# Patient Record
Sex: FEMALE | Race: WHITE | Employment: OTHER | URBAN - METROPOLITAN AREA
[De-identification: names, ages, dates, MRNs, and addresses within clinical notes are randomized per-mention and may not be internally consistent; named-entity substitution may affect disease eponyms.]

---

## 2017-01-01 ENCOUNTER — TRANSFERRED RECORDS (OUTPATIENT)
Dept: HEALTH INFORMATION MANAGEMENT | Facility: CLINIC | Age: 77
End: 2017-01-01

## 2017-01-01 ENCOUNTER — ONCOLOGY VISIT (OUTPATIENT)
Dept: RADIATION ONCOLOGY | Facility: CLINIC | Age: 77
End: 2017-01-01

## 2017-01-01 ENCOUNTER — OFFICE VISIT (OUTPATIENT)
Dept: RADIATION ONCOLOGY | Facility: CLINIC | Age: 77
End: 2017-01-01
Attending: RADIOLOGY
Payer: COMMERCIAL

## 2017-01-01 ENCOUNTER — NURSING HOME VISIT (OUTPATIENT)
Dept: GERIATRICS | Facility: CLINIC | Age: 77
End: 2017-01-01
Payer: COMMERCIAL

## 2017-01-01 ENCOUNTER — APPOINTMENT (OUTPATIENT)
Dept: MRI IMAGING | Facility: CLINIC | Age: 77
DRG: 871 | End: 2017-01-01
Attending: INTERNAL MEDICINE
Payer: COMMERCIAL

## 2017-01-01 ENCOUNTER — TELEPHONE (OUTPATIENT)
Dept: GERIATRIC MEDICINE | Facility: CLINIC | Age: 77
End: 2017-01-01

## 2017-01-01 ENCOUNTER — ANESTHESIA (OUTPATIENT)
Dept: SURGERY | Facility: CLINIC | Age: 77
End: 2017-01-01
Payer: COMMERCIAL

## 2017-01-01 ENCOUNTER — APPOINTMENT (OUTPATIENT)
Dept: GENERAL RADIOLOGY | Facility: CLINIC | Age: 77
End: 2017-01-01
Attending: UROLOGY
Payer: COMMERCIAL

## 2017-01-01 ENCOUNTER — HOSPITAL ENCOUNTER (OUTPATIENT)
Dept: CT IMAGING | Facility: CLINIC | Age: 77
Discharge: HOME | End: 2017-03-31
Attending: RADIOLOGY | Admitting: RADIOLOGY
Payer: COMMERCIAL

## 2017-01-01 ENCOUNTER — HOSPITAL ENCOUNTER (OUTPATIENT)
Facility: CLINIC | Age: 77
Discharge: NURSING, REHAB OR LONG-TERM CARE | End: 2017-03-08
Attending: UROLOGY | Admitting: UROLOGY
Payer: COMMERCIAL

## 2017-01-01 ENCOUNTER — ANESTHESIA EVENT (OUTPATIENT)
Dept: SURGERY | Facility: CLINIC | Age: 77
End: 2017-01-01

## 2017-01-01 ENCOUNTER — APPOINTMENT (OUTPATIENT)
Dept: CT IMAGING | Facility: CLINIC | Age: 77
DRG: 871 | End: 2017-01-01
Attending: INTERNAL MEDICINE
Payer: COMMERCIAL

## 2017-01-01 ENCOUNTER — APPOINTMENT (OUTPATIENT)
Dept: SPEECH THERAPY | Facility: CLINIC | Age: 77
DRG: 871 | End: 2017-01-01
Payer: COMMERCIAL

## 2017-01-01 ENCOUNTER — TELEPHONE (OUTPATIENT)
Dept: GERIATRICS | Facility: CLINIC | Age: 77
End: 2017-01-01

## 2017-01-01 ENCOUNTER — APPOINTMENT (OUTPATIENT)
Dept: GENERAL RADIOLOGY | Facility: CLINIC | Age: 77
DRG: 871 | End: 2017-01-01
Attending: EMERGENCY MEDICINE
Payer: COMMERCIAL

## 2017-01-01 ENCOUNTER — ANESTHESIA EVENT (OUTPATIENT)
Dept: SURGERY | Facility: CLINIC | Age: 77
End: 2017-01-01
Payer: COMMERCIAL

## 2017-01-01 ENCOUNTER — SURGERY (OUTPATIENT)
Age: 77
End: 2017-01-01

## 2017-01-01 ENCOUNTER — APPOINTMENT (OUTPATIENT)
Dept: ULTRASOUND IMAGING | Facility: CLINIC | Age: 77
DRG: 871 | End: 2017-01-01
Attending: INTERNAL MEDICINE
Payer: COMMERCIAL

## 2017-01-01 ENCOUNTER — ANESTHESIA (OUTPATIENT)
Dept: SURGERY | Facility: CLINIC | Age: 77
End: 2017-01-01

## 2017-01-01 ENCOUNTER — HOSPITAL ENCOUNTER (INPATIENT)
Facility: CLINIC | Age: 77
LOS: 1 days | DRG: 871 | End: 2017-04-09
Attending: EMERGENCY MEDICINE | Admitting: HOSPITALIST
Payer: COMMERCIAL

## 2017-01-01 ENCOUNTER — HOSPITAL ENCOUNTER (OUTPATIENT)
Facility: CLINIC | Age: 77
Discharge: NURSING, REHAB OR LONG-TERM CARE | End: 2017-02-21
Attending: UROLOGY | Admitting: UROLOGY
Payer: COMMERCIAL

## 2017-01-01 VITALS
OXYGEN SATURATION: 93 % | BODY MASS INDEX: 30.76 KG/M2 | TEMPERATURE: 98.1 F | DIASTOLIC BLOOD PRESSURE: 61 MMHG | WEIGHT: 191.4 LBS | SYSTOLIC BLOOD PRESSURE: 113 MMHG | HEART RATE: 73 BPM | RESPIRATION RATE: 16 BRPM | HEIGHT: 66 IN

## 2017-01-01 VITALS
SYSTOLIC BLOOD PRESSURE: 112 MMHG | WEIGHT: 192.8 LBS | RESPIRATION RATE: 18 BRPM | OXYGEN SATURATION: 97 % | DIASTOLIC BLOOD PRESSURE: 76 MMHG | TEMPERATURE: 97.7 F | HEIGHT: 66 IN | HEART RATE: 100 BPM | BODY MASS INDEX: 30.98 KG/M2

## 2017-01-01 VITALS
HEART RATE: 77 BPM | HEIGHT: 66 IN | BODY MASS INDEX: 31.05 KG/M2 | WEIGHT: 193.2 LBS | SYSTOLIC BLOOD PRESSURE: 132 MMHG | OXYGEN SATURATION: 92 % | RESPIRATION RATE: 18 BRPM | DIASTOLIC BLOOD PRESSURE: 74 MMHG | TEMPERATURE: 99.1 F

## 2017-01-01 VITALS
WEIGHT: 154 LBS | HEART RATE: 83 BPM | BODY MASS INDEX: 24.75 KG/M2 | RESPIRATION RATE: 16 BRPM | SYSTOLIC BLOOD PRESSURE: 116 MMHG | DIASTOLIC BLOOD PRESSURE: 51 MMHG | TEMPERATURE: 97.7 F | OXYGEN SATURATION: 97 % | HEIGHT: 66 IN

## 2017-01-01 VITALS
HEART RATE: 76 BPM | OXYGEN SATURATION: 95 % | TEMPERATURE: 99.1 F | BODY MASS INDEX: 30.41 KG/M2 | WEIGHT: 189.2 LBS | RESPIRATION RATE: 18 BRPM | HEIGHT: 66 IN | DIASTOLIC BLOOD PRESSURE: 72 MMHG | SYSTOLIC BLOOD PRESSURE: 144 MMHG

## 2017-01-01 VITALS
OXYGEN SATURATION: 96 % | SYSTOLIC BLOOD PRESSURE: 97 MMHG | WEIGHT: 162.3 LBS | HEART RATE: 95 BPM | BODY MASS INDEX: 26.08 KG/M2 | TEMPERATURE: 97.7 F | RESPIRATION RATE: 16 BRPM | HEIGHT: 66 IN | DIASTOLIC BLOOD PRESSURE: 46 MMHG

## 2017-01-01 VITALS
DIASTOLIC BLOOD PRESSURE: 55 MMHG | TEMPERATURE: 97 F | HEART RATE: 94 BPM | OXYGEN SATURATION: 100 % | HEIGHT: 66 IN | BODY MASS INDEX: 25.26 KG/M2 | WEIGHT: 157.2 LBS | RESPIRATION RATE: 20 BRPM | SYSTOLIC BLOOD PRESSURE: 95 MMHG

## 2017-01-01 VITALS — OXYGEN SATURATION: 98 % | SYSTOLIC BLOOD PRESSURE: 105 MMHG | DIASTOLIC BLOOD PRESSURE: 65 MMHG | HEART RATE: 95 BPM

## 2017-01-01 VITALS
OXYGEN SATURATION: 96 % | DIASTOLIC BLOOD PRESSURE: 94 MMHG | HEART RATE: 97 BPM | WEIGHT: 192.2 LBS | RESPIRATION RATE: 18 BRPM | HEIGHT: 66 IN | BODY MASS INDEX: 30.89 KG/M2 | SYSTOLIC BLOOD PRESSURE: 138 MMHG | TEMPERATURE: 97.2 F

## 2017-01-01 VITALS
OXYGEN SATURATION: 98 % | DIASTOLIC BLOOD PRESSURE: 67 MMHG | TEMPERATURE: 97 F | WEIGHT: 157.2 LBS | HEIGHT: 66 IN | BODY MASS INDEX: 25.26 KG/M2 | HEART RATE: 70 BPM | RESPIRATION RATE: 18 BRPM | SYSTOLIC BLOOD PRESSURE: 122 MMHG

## 2017-01-01 VITALS
HEIGHT: 66 IN | RESPIRATION RATE: 22 BRPM | TEMPERATURE: 97.2 F | OXYGEN SATURATION: 92 % | DIASTOLIC BLOOD PRESSURE: 81 MMHG | BODY MASS INDEX: 27.92 KG/M2 | WEIGHT: 173.72 LBS | SYSTOLIC BLOOD PRESSURE: 125 MMHG

## 2017-01-01 VITALS
TEMPERATURE: 97.7 F | OXYGEN SATURATION: 96 % | SYSTOLIC BLOOD PRESSURE: 138 MMHG | BODY MASS INDEX: 25.81 KG/M2 | RESPIRATION RATE: 16 BRPM | WEIGHT: 160.6 LBS | HEIGHT: 66 IN | DIASTOLIC BLOOD PRESSURE: 70 MMHG | HEART RATE: 80 BPM

## 2017-01-01 VITALS
DIASTOLIC BLOOD PRESSURE: 51 MMHG | HEIGHT: 66 IN | SYSTOLIC BLOOD PRESSURE: 100 MMHG | WEIGHT: 160 LBS | RESPIRATION RATE: 14 BRPM | TEMPERATURE: 97.5 F | BODY MASS INDEX: 25.71 KG/M2 | OXYGEN SATURATION: 99 %

## 2017-01-01 VITALS
TEMPERATURE: 96.6 F | OXYGEN SATURATION: 94 % | WEIGHT: 207.4 LBS | HEIGHT: 66 IN | HEART RATE: 85 BPM | SYSTOLIC BLOOD PRESSURE: 134 MMHG | BODY MASS INDEX: 33.33 KG/M2 | DIASTOLIC BLOOD PRESSURE: 66 MMHG | RESPIRATION RATE: 16 BRPM

## 2017-01-01 VITALS
BODY MASS INDEX: 31.91 KG/M2 | OXYGEN SATURATION: 6 % | DIASTOLIC BLOOD PRESSURE: 65 MMHG | WEIGHT: 197.6 LBS | RESPIRATION RATE: 19 BRPM | TEMPERATURE: 97.6 F | SYSTOLIC BLOOD PRESSURE: 131 MMHG | HEART RATE: 90 BPM

## 2017-01-01 VITALS
DIASTOLIC BLOOD PRESSURE: 25 MMHG | BODY MASS INDEX: 25.34 KG/M2 | RESPIRATION RATE: 17 BRPM | TEMPERATURE: 100.3 F | WEIGHT: 157 LBS | SYSTOLIC BLOOD PRESSURE: 94 MMHG | OXYGEN SATURATION: 91 %

## 2017-01-01 DIAGNOSIS — G35 MS (MULTIPLE SCLEROSIS) (H): ICD-10-CM

## 2017-01-01 DIAGNOSIS — C34.32 MALIGNANT NEOPLASM OF LOWER LOBE OF LEFT LUNG (H): Primary | ICD-10-CM

## 2017-01-01 DIAGNOSIS — M25.561 CHRONIC PAIN OF BOTH KNEES: ICD-10-CM

## 2017-01-01 DIAGNOSIS — I82.4Y9 DEEP VEIN THROMBOSIS (DVT) OF PROXIMAL LOWER EXTREMITY, UNSPECIFIED CHRONICITY, UNSPECIFIED LATERALITY (H): ICD-10-CM

## 2017-01-01 DIAGNOSIS — R60.9 EDEMA, UNSPECIFIED TYPE: ICD-10-CM

## 2017-01-01 DIAGNOSIS — C34.31 MALIGNANT NEOPLASM OF LOWER LOBE OF RIGHT LUNG (H): ICD-10-CM

## 2017-01-01 DIAGNOSIS — N20.1 URETERAL STONE: ICD-10-CM

## 2017-01-01 DIAGNOSIS — R91.8 LUNG INFILTRATE: Primary | ICD-10-CM

## 2017-01-01 DIAGNOSIS — B96.4 URINARY TRACT INFECTION DUE TO PROTEUS: Primary | ICD-10-CM

## 2017-01-01 DIAGNOSIS — I10 BENIGN ESSENTIAL HYPERTENSION: ICD-10-CM

## 2017-01-01 DIAGNOSIS — Z86.718 PERSONAL HISTORY OF DVT (DEEP VEIN THROMBOSIS): ICD-10-CM

## 2017-01-01 DIAGNOSIS — R30.0 DYSURIA: ICD-10-CM

## 2017-01-01 DIAGNOSIS — I82.442 ACUTE DEEP VEIN THROMBOSIS (DVT) OF TIBIAL VEIN OF LEFT LOWER EXTREMITY (H): ICD-10-CM

## 2017-01-01 DIAGNOSIS — M79.605 CHRONIC PAIN OF BOTH LOWER EXTREMITIES: Primary | ICD-10-CM

## 2017-01-01 DIAGNOSIS — E11.8 TYPE 2 DIABETES MELLITUS WITH COMPLICATION, WITHOUT LONG-TERM CURRENT USE OF INSULIN (H): ICD-10-CM

## 2017-01-01 DIAGNOSIS — R23.8 SKIN BREAKDOWN: ICD-10-CM

## 2017-01-01 DIAGNOSIS — R09.02 HYPOXIA: Primary | ICD-10-CM

## 2017-01-01 DIAGNOSIS — R60.0 BILATERAL LEG EDEMA: ICD-10-CM

## 2017-01-01 DIAGNOSIS — G89.29 OTHER CHRONIC PAIN: ICD-10-CM

## 2017-01-01 DIAGNOSIS — E43 EDEMA DUE TO MALNUTRITION, DUE TO UNSPECIFIED MALNUTRITION TYPE (H): ICD-10-CM

## 2017-01-01 DIAGNOSIS — D64.9 ANEMIA, UNSPECIFIED TYPE: ICD-10-CM

## 2017-01-01 DIAGNOSIS — C34.92 NON-SMALL CELL CANCER OF LEFT LUNG (H): ICD-10-CM

## 2017-01-01 DIAGNOSIS — S70.12XD HEMATOMA OF LEFT THIGH, SUBSEQUENT ENCOUNTER: ICD-10-CM

## 2017-01-01 DIAGNOSIS — A41.9 SEPTIC SHOCK (H): ICD-10-CM

## 2017-01-01 DIAGNOSIS — R65.21 SEPTIC SHOCK (H): ICD-10-CM

## 2017-01-01 DIAGNOSIS — E11.40 TYPE 2 DIABETES MELLITUS WITH DIABETIC NEUROPATHY, WITHOUT LONG-TERM CURRENT USE OF INSULIN (H): ICD-10-CM

## 2017-01-01 DIAGNOSIS — N20.0 KIDNEY STONE: Primary | ICD-10-CM

## 2017-01-01 DIAGNOSIS — M79.604 CHRONIC PAIN OF BOTH LOWER EXTREMITIES: ICD-10-CM

## 2017-01-01 DIAGNOSIS — Z79.01 LONG TERM (CURRENT) USE OF ANTICOAGULANTS: ICD-10-CM

## 2017-01-01 DIAGNOSIS — G89.29 CHRONIC PAIN OF BOTH LOWER EXTREMITIES: ICD-10-CM

## 2017-01-01 DIAGNOSIS — R63.4 WEIGHT LOSS: ICD-10-CM

## 2017-01-01 DIAGNOSIS — G89.29 CHRONIC PAIN OF BOTH KNEES: ICD-10-CM

## 2017-01-01 DIAGNOSIS — Z86.19 H/O SEPSIS: ICD-10-CM

## 2017-01-01 DIAGNOSIS — R53.83 LETHARGY: Primary | ICD-10-CM

## 2017-01-01 DIAGNOSIS — R39.9 URINARY TRACT INFECTION SYMPTOMS: ICD-10-CM

## 2017-01-01 DIAGNOSIS — T14.8XXA DEEP TISSUE INJURY: Primary | ICD-10-CM

## 2017-01-01 DIAGNOSIS — M62.81 GENERALIZED MUSCLE WEAKNESS: ICD-10-CM

## 2017-01-01 DIAGNOSIS — G89.29 CHRONIC PAIN OF BOTH LOWER EXTREMITIES: Primary | ICD-10-CM

## 2017-01-01 DIAGNOSIS — E78.5 HYPERLIPIDEMIA, UNSPECIFIED HYPERLIPIDEMIA TYPE: ICD-10-CM

## 2017-01-01 DIAGNOSIS — J18.9 PNEUMONIA OF BOTH LUNGS DUE TO INFECTIOUS ORGANISM, UNSPECIFIED PART OF LUNG: ICD-10-CM

## 2017-01-01 DIAGNOSIS — L89.95: ICD-10-CM

## 2017-01-01 DIAGNOSIS — F03.90 DEMENTIA WITHOUT BEHAVIORAL DISTURBANCE, UNSPECIFIED DEMENTIA TYPE: ICD-10-CM

## 2017-01-01 DIAGNOSIS — R09.2 RESPIRATORY ARREST (H): ICD-10-CM

## 2017-01-01 DIAGNOSIS — G92.9 TOXIC ENCEPHALOPATHY: ICD-10-CM

## 2017-01-01 DIAGNOSIS — Z51.81 ENCOUNTER FOR THERAPEUTIC DRUG MONITORING: ICD-10-CM

## 2017-01-01 DIAGNOSIS — I82.442 ACUTE DEEP VEIN THROMBOSIS (DVT) OF TIBIAL VEIN OF LEFT LOWER EXTREMITY (H): Primary | ICD-10-CM

## 2017-01-01 DIAGNOSIS — C34.92 NON-SMALL CELL LUNG CANCER, LEFT (H): Primary | ICD-10-CM

## 2017-01-01 DIAGNOSIS — M79.605 CHRONIC PAIN OF BOTH LOWER EXTREMITIES: ICD-10-CM

## 2017-01-01 DIAGNOSIS — G35 MS (MULTIPLE SCLEROSIS) (H): Primary | ICD-10-CM

## 2017-01-01 DIAGNOSIS — N39.0 URINARY TRACT INFECTION DUE TO PROTEUS: Primary | ICD-10-CM

## 2017-01-01 DIAGNOSIS — C34.92 NON-SMALL CELL CANCER OF LEFT LUNG (H): Primary | ICD-10-CM

## 2017-01-01 DIAGNOSIS — T14.8XXA DEEP TISSUE INJURY: ICD-10-CM

## 2017-01-01 DIAGNOSIS — R78.81 BACTEREMIA: ICD-10-CM

## 2017-01-01 DIAGNOSIS — N39.0 URINARY TRACT INFECTION WITHOUT HEMATURIA, SITE UNSPECIFIED: ICD-10-CM

## 2017-01-01 DIAGNOSIS — M79.604 CHRONIC PAIN OF BOTH LOWER EXTREMITIES: Primary | ICD-10-CM

## 2017-01-01 DIAGNOSIS — R39.81 FUNCTIONAL URINARY INCONTINENCE: ICD-10-CM

## 2017-01-01 DIAGNOSIS — G62.9 NEUROPATHY: ICD-10-CM

## 2017-01-01 DIAGNOSIS — I51.81 STRESS-INDUCED CARDIOMYOPATHY: ICD-10-CM

## 2017-01-01 DIAGNOSIS — R63.4 LOSS OF WEIGHT: ICD-10-CM

## 2017-01-01 DIAGNOSIS — G35 MULTIPLE SCLEROSIS (H): ICD-10-CM

## 2017-01-01 DIAGNOSIS — D72.829 LEUKOCYTOSIS, UNSPECIFIED TYPE: Primary | ICD-10-CM

## 2017-01-01 DIAGNOSIS — N39.0 URINARY TRACT INFECTION, SITE UNSPECIFIED: Primary | ICD-10-CM

## 2017-01-01 DIAGNOSIS — F41.8 DEPRESSION WITH ANXIETY: ICD-10-CM

## 2017-01-01 DIAGNOSIS — A41.9 SEPSIS, DUE TO UNSPECIFIED ORGANISM: ICD-10-CM

## 2017-01-01 DIAGNOSIS — M25.562 CHRONIC PAIN OF BOTH KNEES: ICD-10-CM

## 2017-01-01 DIAGNOSIS — G93.40 ENCEPHALOPATHY: ICD-10-CM

## 2017-01-01 DIAGNOSIS — N20.1 CALCULUS OF URETER: ICD-10-CM

## 2017-01-01 LAB
ALBUMIN SERPL-MCNC: 0.9 G/DL (ref 3.4–5)
ALBUMIN UR-MCNC: 30 MG/DL
ALP SERPL-CCNC: 123 U/L (ref 40–150)
ALT SERPL W P-5'-P-CCNC: 21 U/L (ref 0–50)
ANION GAP SERPL CALCULATED.3IONS-SCNC: 10 MMOL/L (ref 0–15)
ANION GAP SERPL CALCULATED.3IONS-SCNC: 11 MMOL/L (ref 0–15)
ANION GAP SERPL CALCULATED.3IONS-SCNC: 11 MMOL/L (ref 0–15)
ANION GAP SERPL CALCULATED.3IONS-SCNC: 13 MMOL/L (ref 3–14)
ANION GAP SERPL CALCULATED.3IONS-SCNC: 8 MMOL/L (ref 0–15)
ANION GAP SERPL CALCULATED.3IONS-SCNC: 8 MMOL/L (ref 0–15)
APPEARANCE UR: ABNORMAL
APTT PPP: 51 SEC (ref 22–37)
AST SERPL W P-5'-P-CCNC: 41 U/L (ref 0–45)
AST SERPL-CCNC: 14 IU/L (ref 12.37–?)
BASE DEFICIT BLDA-SCNC: 5.5 MMOL/L
BASE EXCESS BLDV CALC-SCNC: 0.3 MMOL/L
BASOPHILS # BLD AUTO: 0 10E9/L (ref 0–0.2)
BASOPHILS NFR BLD AUTO: 0 %
BILIRUB SERPL-MCNC: 0.2 MG/DL (ref 0.2–1.3)
BILIRUB UR QL STRIP: NEGATIVE
BUN SERPL-MCNC: 13 MG/DL (ref 7–24)
BUN SERPL-MCNC: 14 MG/DL (ref 7–24)
BUN SERPL-MCNC: 16 MG/DL (ref 7–24)
BUN SERPL-MCNC: 16 MG/DL (ref 7–24)
BUN SERPL-MCNC: 25 MG/DL (ref 7–24)
BUN SERPL-MCNC: 50 MG/DL (ref 7–30)
CALCIUM SERPL-MCNC: 11.8 MG/DL (ref 8.5–10.1)
CALCIUM SERPL-MCNC: 8.2 MG/DL (ref 8.5–10.1)
CALCIUM SERPL-MCNC: 8.5 MG/DL (ref 8.5–10.1)
CALCIUM SERPL-MCNC: 8.7 MG/DL (ref 8.5–10.1)
CALCIUM SERPL-MCNC: 8.9 MG/DL (ref 8.5–10.1)
CALCIUM SERPL-MCNC: 9.2 MG/DL (ref 8.5–10.1)
CHLORIDE SERPL-SCNC: 114 MMOL/L (ref 94–109)
CHLORIDE SERPLBLD-SCNC: 100 MMOL/L (ref 98–112)
CHLORIDE SERPLBLD-SCNC: 102 MMOL/L (ref 98–112)
CHLORIDE SERPLBLD-SCNC: 102 MMOL/L (ref 98–112)
CHLORIDE SERPLBLD-SCNC: 103 MMOL/L (ref 98–112)
CHLORIDE SERPLBLD-SCNC: 106 MMOL/L (ref 96–112)
CO2 SERPL-SCNC: 25 MMOL/L (ref 20–32)
CO2 SERPL-SCNC: 25 MMOL/L (ref 21–32)
CO2 SERPL-SCNC: 26 MMOL/L (ref 21–32)
CO2 SERPL-SCNC: 26 MMOL/L (ref 21–32)
CO2 SERPL-SCNC: 27 MMOL/L (ref 21–32)
CO2 SERPL-SCNC: 31 MMOL/L (ref 21–32)
COLOR UR AUTO: YELLOW
CREAT SERPL-MCNC: 0.48 MG/DL (ref 0.55–1.02)
CREAT SERPL-MCNC: 0.52 MG/DL (ref 0.55–1.02)
CREAT SERPL-MCNC: 0.53 MG/DL (ref 0.55–1.02)
CREAT SERPL-MCNC: 0.66 MG/DL (ref 0.55–1.02)
CREAT SERPL-MCNC: 0.7 MG/DL (ref 0.55–1.02)
CREAT SERPL-MCNC: 0.75 MG/DL (ref 0.55–1.02)
CREAT SERPL-MCNC: 1.41 MG/DL (ref 0.52–1.04)
D DIMER PPP FEU-MCNC: 12.1 UG/ML FEU (ref 0–0.5)
DIFFERENTIAL METHOD BLD: ABNORMAL
DIFFERENTIAL: ABNORMAL
EOSINOPHIL # BLD AUTO: 0.2 10E9/L (ref 0–0.7)
EOSINOPHIL NFR BLD AUTO: 1 %
ERYTHROCYTE [DISTWIDTH] IN BLOOD BY AUTOMATED COUNT: 16.6 % (ref 11.5–14.5)
ERYTHROCYTE [DISTWIDTH] IN BLOOD BY AUTOMATED COUNT: 17.8 % (ref 10–15)
ERYTHROCYTE [DISTWIDTH] IN BLOOD BY AUTOMATED COUNT: 19.3 % (ref 11.5–14.5)
ERYTHROCYTE [DISTWIDTH] IN BLOOD BY AUTOMATED COUNT: 19.7 % (ref 11.5–14.5)
ERYTHROCYTE [DISTWIDTH] IN BLOOD BY AUTOMATED COUNT: 19.7 % (ref 11.5–14.5)
ERYTHROCYTE [DISTWIDTH] IN BLOOD BY AUTOMATED COUNT: 19.8 % (ref 11.5–14.5)
ERYTHROCYTE [DISTWIDTH] IN BLOOD BY AUTOMATED COUNT: 20.2 % (ref 11.5–14.5)
GFR SERPL CREATININE-BSD FRML MDRD: 36 ML/MIN/1.7M2
GFR SERPL CREATININE-BSD FRML MDRD: >60 ML/MIN/1.73M2
GLUCOSE BLDC GLUCOMTR-MCNC: 106 MG/DL (ref 70–99)
GLUCOSE SERPL-MCNC: 121 MG/DL (ref 74–106)
GLUCOSE SERPL-MCNC: 136 MG/DL (ref 74–106)
GLUCOSE SERPL-MCNC: 62 MG/DL (ref 70–99)
GLUCOSE SERPL-MCNC: 84 MG/DL (ref 74–106)
GLUCOSE SERPL-MCNC: 86 MG/DL (ref 74–106)
GLUCOSE SERPL-MCNC: 97 MG/DL (ref 74–106)
GLUCOSE UR STRIP-MCNC: NEGATIVE MG/DL
HBA1C MFR BLD: 5 % (ref 0–5.7)
HCO3 BLD-SCNC: 24 MMOL/L (ref 21–28)
HCO3 BLDV-SCNC: 28 MMOL/L (ref 21–28)
HCT VFR BLD AUTO: 25.7 % (ref 36–48)
HCT VFR BLD AUTO: 30.5 % (ref 36–48)
HCT VFR BLD AUTO: 30.8 % (ref 36–48)
HCT VFR BLD AUTO: 31.4 % (ref 36–48)
HCT VFR BLD AUTO: 32.2 % (ref 36–48)
HCT VFR BLD AUTO: 32.4 % (ref 35–47)
HCT VFR BLD AUTO: 33.6 % (ref 36–48)
HEMOGLOBIN: 10 GM/DL (ref 12–16)
HEMOGLOBIN: 10.3 G/DL (ref 12–16)
HEMOGLOBIN: 8.2 G/DL (ref 12–16)
HEMOGLOBIN: 9.5 G/DL (ref 12–16)
HEMOGLOBIN: 9.6 GM/DL (ref 12–16)
HEMOGLOBIN: 9.6 GM/DL (ref 12–16)
HGB BLD-MCNC: 10 G/DL (ref 11.7–15.7)
HGB UR QL STRIP: ABNORMAL
HYALINE CASTS #/AREA URNS LPF: 18 /LPF (ref 0–2)
INR PPP: 1.04 (ref 0.86–1.14)
INR PPP: 1.2 (ref 1–1.2)
INR PPP: 1.4 (ref 1–1.2)
INR PPP: 2 (ref 1–1.2)
INR PPP: 2 (ref 1–1.5)
INR PPP: 2.2 (ref 1–1.2)
INR PPP: 2.5 (ref 1–1.2)
INR PPP: 6.57 (ref 0.86–1.14)
INTERPRETATION ECG - MUSE: NORMAL
KETONES UR STRIP-MCNC: NEGATIVE MG/DL
LACTATE BLD-SCNC: 10.6 MMOL/L (ref 0.7–2.1)
LEUKOCYTE ESTERASE UR QL STRIP: ABNORMAL
LYMPHOCYTES # BLD AUTO: 0.9 10E9/L (ref 0.8–5.3)
LYMPHOCYTES NFR BLD AUTO: 5 %
MACROCYTES BLD QL SMEAR: PRESENT
MCH RBC QN AUTO: 31 PG (ref 27–33)
MCH RBC QN AUTO: 31.7 PG (ref 26.5–33)
MCH RBC QN AUTO: 32 PG (ref 27–33)
MCH RBC QN AUTO: 32 PG (ref 27–33)
MCHC RBC AUTO-ENTMCNC: 30.9 G/DL (ref 31.5–36.5)
MCHC RBC AUTO-ENTMCNC: 31 G/DL (ref 33–36)
MCHC RBC AUTO-ENTMCNC: 31 G/DL (ref 33–36)
MCHC RBC AUTO-ENTMCNC: 31 GM/DL (ref 33–36)
MCHC RBC AUTO-ENTMCNC: 32 GM/DL (ref 33–36)
MCV RBC AUTO: 100 FL (ref 80–100)
MCV RBC AUTO: 101 FL (ref 80–100)
MCV RBC AUTO: 103 FL (ref 78–100)
MCV RBC AUTO: 103 FL (ref 80–100)
MCV RBC AUTO: 104 FL (ref 80–100)
MCV RBC AUTO: 96 FL (ref 80–100)
MCV RBC AUTO: 99 FL (ref 80–100)
METAMYELOCYTES # BLD: 0.2 10E9/L
METAMYELOCYTES NFR BLD MANUAL: 1 %
MONOCYTES # BLD AUTO: 0.2 10E9/L (ref 0–1.3)
MONOCYTES NFR BLD AUTO: 1 %
MUCOUS THREADS #/AREA URNS LPF: PRESENT /LPF
MYELOCYTES # BLD: 0.5 10E9/L
MYELOCYTES NFR BLD MANUAL: 3 %
NEUTROPHILS # BLD AUTO: 15.3 10E9/L (ref 1.6–8.3)
NEUTROPHILS NFR BLD AUTO: 89 %
NITRATE UR QL: NEGATIVE
O2/TOTAL GAS SETTING VFR VENT: 100 %
O2/TOTAL GAS SETTING VFR VENT: 100 %
OXYHGB MFR BLDV: 54 %
PCO2 BLD: 77 MM HG (ref 35–45)
PCO2 BLDV: 66 MM HG (ref 40–50)
PH BLD: 7.1 PH (ref 7.35–7.45)
PH BLDV: 7.23 PH (ref 7.32–7.43)
PH UR STRIP: 6 PH (ref 5–7)
PLATELET # BLD AUTO: 301 K/UL (ref 150–400)
PLATELET # BLD AUTO: 346 10E9/L (ref 150–450)
PLATELET # BLD AUTO: 354 K/UL (ref 150–400)
PLATELET # BLD AUTO: 358 K/UL (ref 150–400)
PLATELET # BLD AUTO: 372 K/UL (ref 150–400)
PLATELET # BLD AUTO: 387 K/UL (ref 150–400)
PLATELET # BLD AUTO: 388 K/UL (ref 150–400)
PLATELET # BLD EST: ABNORMAL 10*3/UL
PO2 BLD: 40 MM HG (ref 80–105)
PO2 BLDV: 37 MM HG (ref 25–47)
POTASSIUM SERPL-SCNC: 3 MMOL/L (ref 3.4–5.3)
POTASSIUM SERPL-SCNC: 3.6 MMOL/L (ref 3.5–5.1)
POTASSIUM SERPL-SCNC: 3.6 MMOL/L (ref 3.5–5.1)
POTASSIUM SERPL-SCNC: 3.8 MMOL/L (ref 3.5–5.1)
POTASSIUM SERPL-SCNC: 4 MMOL/L (ref 3.5–5.1)
POTASSIUM SERPL-SCNC: 4.1 MMOL/L (ref 3.5–5.1)
POTASSIUM SERPL-SCNC: 4.3 MMOL/L (ref 3.4–5.3)
PROT SERPL-MCNC: 5.6 G/DL (ref 6.8–8.8)
PT BLD: 25.2 SEC (ref 10–13)
PT BLD: 28.9 SEC (ref 10.3–13)
RADIOLOGIST FLAGS: ABNORMAL
RBC # BLD AUTO: 2.68 M/UL (ref 4.2–5.4)
RBC # BLD AUTO: 3.04 M/UL (ref 4.2–5.4)
RBC # BLD AUTO: 3.06 M/UL (ref 4–5.4)
RBC # BLD AUTO: 3.07 M/UL (ref 4.2–5.4)
RBC # BLD AUTO: 3.15 10E12/L (ref 3.8–5.2)
RBC # BLD AUTO: 3.21 M/UL (ref 4.2–6.4)
RBC # BLD AUTO: 3.23 M/UL (ref 4.2–5.4)
RBC #/AREA URNS AUTO: 13 /HPF (ref 0–2)
SODIUM SERPL-SCNC: 137 MMOL/L (ref 136–145)
SODIUM SERPL-SCNC: 138 MMOL/L (ref 136–145)
SODIUM SERPL-SCNC: 138 MMOL/L (ref 136–145)
SODIUM SERPL-SCNC: 141 MMOL/L (ref 136–145)
SODIUM SERPL-SCNC: 142 MMOL/L (ref 136–145)
SODIUM SERPL-SCNC: 152 MMOL/L (ref 133–144)
SP GR UR STRIP: 1.02 (ref 1–1.03)
SQUAMOUS #/AREA URNS AUTO: 3 /HPF (ref 0–1)
URN SPEC COLLECT METH UR: ABNORMAL
UROBILINOGEN UR STRIP-MCNC: NORMAL MG/DL (ref 0–2)
WBC # BLD AUTO: 10.2 K/UL (ref 4.3–10.8)
WBC # BLD AUTO: 11.8 K/UL (ref 4.3–10.8)
WBC # BLD AUTO: 12.1 K/UL (ref 4.3–10.5)
WBC # BLD AUTO: 12.2 K/UL (ref 4.3–10.8)
WBC # BLD AUTO: 13.2 K/UL (ref 4.3–10.8)
WBC # BLD AUTO: 17.2 10E9/L (ref 4–11)
WBC # BLD AUTO: 7.8 K/UL (ref 4.3–10.8)
WBC #/AREA URNS AUTO: >182 /HPF (ref 0–2)
WBC CLUMPS #/AREA URNS HPF: PRESENT /HPF

## 2017-01-01 PROCEDURE — 25800025 ZZH RX 258: Performed by: EMERGENCY MEDICINE

## 2017-01-01 PROCEDURE — 99207 ZZC CDG-CORRECTLY CODED, REVIEWED AND AGREE: CPT | Performed by: NURSE PRACTITIONER

## 2017-01-01 PROCEDURE — 99310 SBSQ NF CARE HIGH MDM 45: CPT | Performed by: NURSE PRACTITIONER

## 2017-01-01 PROCEDURE — 37000009 ZZH ANESTHESIA TECHNICAL FEE, EACH ADDTL 15 MIN: Performed by: UROLOGY

## 2017-01-01 PROCEDURE — 87186 SC STD MICRODIL/AGAR DIL: CPT | Performed by: EMERGENCY MEDICINE

## 2017-01-01 PROCEDURE — 71000013 ZZH RECOVERY PHASE 1 LEVEL 1 EA ADDTL HR: Performed by: UROLOGY

## 2017-01-01 PROCEDURE — 25000128 H RX IP 250 OP 636

## 2017-01-01 PROCEDURE — 99309 SBSQ NF CARE MODERATE MDM 30: CPT | Performed by: INTERNAL MEDICINE

## 2017-01-01 PROCEDURE — 96368 THER/DIAG CONCURRENT INF: CPT

## 2017-01-01 PROCEDURE — 73700 CT LOWER EXTREMITY W/O DYE: CPT | Mod: 50

## 2017-01-01 PROCEDURE — 77373 STRTCTC BDY RAD THER TX DLVR: CPT | Performed by: RADIOLOGY

## 2017-01-01 PROCEDURE — 94640 AIRWAY INHALATION TREATMENT: CPT

## 2017-01-01 PROCEDURE — 96375 TX/PRO/DX INJ NEW DRUG ADDON: CPT

## 2017-01-01 PROCEDURE — 87088 URINE BACTERIA CULTURE: CPT | Performed by: EMERGENCY MEDICINE

## 2017-01-01 PROCEDURE — 40000277 XR SURGERY CARM FLUORO LESS THAN 5 MIN W STILLS

## 2017-01-01 PROCEDURE — 99214 OFFICE O/P EST MOD 30 MIN: CPT | Performed by: RADIOLOGY

## 2017-01-01 PROCEDURE — 85379 FIBRIN DEGRADATION QUANT: CPT | Performed by: EMERGENCY MEDICINE

## 2017-01-01 PROCEDURE — 25000128 H RX IP 250 OP 636: Performed by: EMERGENCY MEDICINE

## 2017-01-01 PROCEDURE — 36000050 ZZH SURGERY LEVEL 2 1ST 30 MIN: Performed by: UROLOGY

## 2017-01-01 PROCEDURE — 25000128 H RX IP 250 OP 636: Performed by: UROLOGY

## 2017-01-01 PROCEDURE — 40000171 ZZH STATISTIC PRE-PROCEDURE ASSESSMENT III: Performed by: UROLOGY

## 2017-01-01 PROCEDURE — 00000146 ZZHCL STATISTIC GLUCOSE BY METER IP

## 2017-01-01 PROCEDURE — 31500 INSERT EMERGENCY AIRWAY: CPT

## 2017-01-01 PROCEDURE — 25800025 ZZH RX 258: Performed by: UROLOGY

## 2017-01-01 PROCEDURE — 96365 THER/PROPH/DIAG IV INF INIT: CPT

## 2017-01-01 PROCEDURE — 5A1935Z RESPIRATORY VENTILATION, LESS THAN 24 CONSECUTIVE HOURS: ICD-10-PCS | Performed by: EMERGENCY MEDICINE

## 2017-01-01 PROCEDURE — 25000125 ZZHC RX 250: Performed by: NURSE ANESTHETIST, CERTIFIED REGISTERED

## 2017-01-01 PROCEDURE — 71000027 ZZH RECOVERY PHASE 2 EACH 15 MINS: Performed by: UROLOGY

## 2017-01-01 PROCEDURE — 99207 ZZC NON-BILLABLE SERV PER CHARTING: CPT | Performed by: HOSPITALIST

## 2017-01-01 PROCEDURE — 36680 INSERT NEEDLE BONE CAVITY: CPT

## 2017-01-01 PROCEDURE — 36556 INSERT NON-TUNNEL CV CATH: CPT

## 2017-01-01 PROCEDURE — 99308 SBSQ NF CARE LOW MDM 20: CPT | Performed by: NURSE PRACTITIONER

## 2017-01-01 PROCEDURE — 27210995 ZZH RX 272: Performed by: UROLOGY

## 2017-01-01 PROCEDURE — 25000566 ZZH SEVOFLURANE, EA 15 MIN: Performed by: UROLOGY

## 2017-01-01 PROCEDURE — 71250 CT THORAX DX C-: CPT

## 2017-01-01 PROCEDURE — 77336 RADIATION PHYSICS CONSULT: CPT | Performed by: RADIOLOGY

## 2017-01-01 PROCEDURE — 85730 THROMBOPLASTIN TIME PARTIAL: CPT | Performed by: EMERGENCY MEDICINE

## 2017-01-01 PROCEDURE — 25000128 H RX IP 250 OP 636: Performed by: NURSE ANESTHETIST, CERTIFIED REGISTERED

## 2017-01-01 PROCEDURE — 81001 URINALYSIS AUTO W/SCOPE: CPT | Performed by: EMERGENCY MEDICINE

## 2017-01-01 PROCEDURE — 92950 HEART/LUNG RESUSCITATION CPR: CPT

## 2017-01-01 PROCEDURE — 36000052 ZZH SURGERY LEVEL 2 EA 15 ADDTL MIN: Performed by: UROLOGY

## 2017-01-01 PROCEDURE — 27210794 ZZH OR GENERAL SUPPLY STERILE: Performed by: UROLOGY

## 2017-01-01 PROCEDURE — 12000000 ZZH R&B MED SURG/OB

## 2017-01-01 PROCEDURE — 73721 MRI JNT OF LWR EXTRE W/O DYE: CPT | Mod: LT

## 2017-01-01 PROCEDURE — 76942 ECHO GUIDE FOR BIOPSY: CPT

## 2017-01-01 PROCEDURE — 25000125 ZZHC RX 250

## 2017-01-01 PROCEDURE — 80053 COMPREHEN METABOLIC PANEL: CPT | Performed by: EMERGENCY MEDICINE

## 2017-01-01 PROCEDURE — 84132 ASSAY OF SERUM POTASSIUM: CPT | Performed by: ANESTHESIOLOGY

## 2017-01-01 PROCEDURE — 82805 BLOOD GASES W/O2 SATURATION: CPT | Performed by: EMERGENCY MEDICINE

## 2017-01-01 PROCEDURE — 36415 COLL VENOUS BLD VENIPUNCTURE: CPT | Performed by: ANESTHESIOLOGY

## 2017-01-01 PROCEDURE — 71000012 ZZH RECOVERY PHASE 1 LEVEL 1 FIRST HR: Performed by: UROLOGY

## 2017-01-01 PROCEDURE — 25800025 ZZH RX 258: Performed by: NURSE ANESTHETIST, CERTIFIED REGISTERED

## 2017-01-01 PROCEDURE — 99306 1ST NF CARE HIGH MDM 50: CPT | Performed by: INTERNAL MEDICINE

## 2017-01-01 PROCEDURE — 40000275 ZZH STATISTIC RCP TIME EA 10 MIN

## 2017-01-01 PROCEDURE — 85610 PROTHROMBIN TIME: CPT | Performed by: EMERGENCY MEDICINE

## 2017-01-01 PROCEDURE — 99207 ZZC CDG-CORRECTLY CODED, REVIEWED AND AGREE: CPT | Performed by: INTERNAL MEDICINE

## 2017-01-01 PROCEDURE — 25000125 ZZHC RX 250: Performed by: EMERGENCY MEDICINE

## 2017-01-01 PROCEDURE — 99285 EMERGENCY DEPT VISIT HI MDM: CPT | Mod: 25

## 2017-01-01 PROCEDURE — 37000008 ZZH ANESTHESIA TECHNICAL FEE, 1ST 30 MIN: Performed by: UROLOGY

## 2017-01-01 PROCEDURE — 25000125 ZZHC RX 250: Performed by: UROLOGY

## 2017-01-01 PROCEDURE — C1769 GUIDE WIRE: HCPCS | Performed by: UROLOGY

## 2017-01-01 PROCEDURE — 40000883 ZZH CANCELLED SURGERY UP TO 61-90 MINS: Performed by: UROLOGY

## 2017-01-01 PROCEDURE — 85025 COMPLETE CBC W/AUTO DIFF WBC: CPT | Performed by: EMERGENCY MEDICINE

## 2017-01-01 PROCEDURE — 3E043XZ INTRODUCTION OF VASOPRESSOR INTO CENTRAL VEIN, PERCUTANEOUS APPROACH: ICD-10-PCS | Performed by: EMERGENCY MEDICINE

## 2017-01-01 PROCEDURE — 87077 CULTURE AEROBIC IDENTIFY: CPT | Performed by: EMERGENCY MEDICINE

## 2017-01-01 PROCEDURE — 83605 ASSAY OF LACTIC ACID: CPT | Performed by: EMERGENCY MEDICINE

## 2017-01-01 PROCEDURE — 99223 1ST HOSP IP/OBS HIGH 75: CPT | Mod: AI | Performed by: HOSPITALIST

## 2017-01-01 PROCEDURE — 40000940 XR CHEST PORT 1 VW

## 2017-01-01 PROCEDURE — 94002 VENT MGMT INPAT INIT DAY: CPT

## 2017-01-01 PROCEDURE — 93971 EXTREMITY STUDY: CPT | Mod: LT

## 2017-01-01 PROCEDURE — 40000256 ZZH STATISTIC CARDIOPULM RESUSCITATION

## 2017-01-01 PROCEDURE — 99309 SBSQ NF CARE MODERATE MDM 30: CPT | Performed by: NURSE PRACTITIONER

## 2017-01-01 PROCEDURE — 82803 BLOOD GASES ANY COMBINATION: CPT | Performed by: EMERGENCY MEDICINE

## 2017-01-01 PROCEDURE — 85610 PROTHROMBIN TIME: CPT | Performed by: ANESTHESIOLOGY

## 2017-01-01 PROCEDURE — C1758 CATHETER, URETERAL: HCPCS | Performed by: UROLOGY

## 2017-01-01 PROCEDURE — 87086 URINE CULTURE/COLONY COUNT: CPT | Performed by: EMERGENCY MEDICINE

## 2017-01-01 PROCEDURE — 87040 BLOOD CULTURE FOR BACTERIA: CPT | Performed by: EMERGENCY MEDICINE

## 2017-01-01 RX ORDER — LORAZEPAM 0.5 MG/1
.5-1 TABLET ORAL
Status: DISCONTINUED | OUTPATIENT
Start: 2017-01-01 | End: 2017-01-01 | Stop reason: HOSPADM

## 2017-01-01 RX ORDER — IPRATROPIUM BROMIDE AND ALBUTEROL SULFATE 2.5; .5 MG/3ML; MG/3ML
3 SOLUTION RESPIRATORY (INHALATION) ONCE
Status: DISCONTINUED | OUTPATIENT
Start: 2017-01-01 | End: 2017-01-01 | Stop reason: HOSPADM

## 2017-01-01 RX ORDER — FENTANYL CITRATE 50 UG/ML
25-50 INJECTION, SOLUTION INTRAMUSCULAR; INTRAVENOUS
Status: DISCONTINUED | OUTPATIENT
Start: 2017-01-01 | End: 2017-01-01 | Stop reason: HOSPADM

## 2017-01-01 RX ORDER — SODIUM CHLORIDE, SODIUM LACTATE, POTASSIUM CHLORIDE, CALCIUM CHLORIDE 600; 310; 30; 20 MG/100ML; MG/100ML; MG/100ML; MG/100ML
INJECTION, SOLUTION INTRAVENOUS CONTINUOUS PRN
Status: DISCONTINUED | OUTPATIENT
Start: 2017-01-01 | End: 2017-01-01

## 2017-01-01 RX ORDER — ONDANSETRON 4 MG/1
4 TABLET, ORALLY DISINTEGRATING ORAL EVERY 6 HOURS PRN
Status: DISCONTINUED | OUTPATIENT
Start: 2017-01-01 | End: 2017-01-01 | Stop reason: HOSPADM

## 2017-01-01 RX ORDER — FENTANYL CITRATE 0.05 MG/ML
25-50 INJECTION, SOLUTION INTRAMUSCULAR; INTRAVENOUS
Status: DISCONTINUED | OUTPATIENT
Start: 2017-01-01 | End: 2017-01-01 | Stop reason: HOSPADM

## 2017-01-01 RX ORDER — BISACODYL 10 MG
10 SUPPOSITORY, RECTAL RECTAL
Status: DISCONTINUED | OUTPATIENT
Start: 2017-04-12 | End: 2017-01-01 | Stop reason: HOSPADM

## 2017-01-01 RX ORDER — CIPROFLOXACIN 500 MG/1
500 TABLET, FILM COATED ORAL 2 TIMES DAILY
Qty: 10 TABLET | Refills: 0 | Status: SHIPPED | OUTPATIENT
Start: 2017-01-01 | End: 2017-01-01

## 2017-01-01 RX ORDER — LORAZEPAM 2 MG/ML
.5-1 INJECTION INTRAMUSCULAR
Status: DISCONTINUED | OUTPATIENT
Start: 2017-01-01 | End: 2017-01-01 | Stop reason: HOSPADM

## 2017-01-01 RX ORDER — IPRATROPIUM BROMIDE AND ALBUTEROL SULFATE 2.5; .5 MG/3ML; MG/3ML
1 SOLUTION RESPIRATORY (INHALATION) 4 TIMES DAILY
COMMUNITY

## 2017-01-01 RX ORDER — MORPHINE SULFATE 4 MG/ML
4 INJECTION, SOLUTION INTRAMUSCULAR; INTRAVENOUS ONCE
Status: COMPLETED | OUTPATIENT
Start: 2017-01-01 | End: 2017-01-01

## 2017-01-01 RX ORDER — SODIUM CHLORIDE, SODIUM LACTATE, POTASSIUM CHLORIDE, CALCIUM CHLORIDE 600; 310; 30; 20 MG/100ML; MG/100ML; MG/100ML; MG/100ML
INJECTION, SOLUTION INTRAVENOUS CONTINUOUS
Status: DISCONTINUED | OUTPATIENT
Start: 2017-01-01 | End: 2017-01-01 | Stop reason: HOSPADM

## 2017-01-01 RX ORDER — MEPERIDINE HYDROCHLORIDE 25 MG/ML
12.5 INJECTION INTRAMUSCULAR; INTRAVENOUS; SUBCUTANEOUS
Status: DISCONTINUED | OUTPATIENT
Start: 2017-01-01 | End: 2017-01-01 | Stop reason: HOSPADM

## 2017-01-01 RX ORDER — MORPHINE SULFATE 4 MG/ML
INJECTION, SOLUTION INTRAMUSCULAR; INTRAVENOUS
Status: COMPLETED
Start: 2017-01-01 | End: 2017-01-01

## 2017-01-01 RX ORDER — LIDOCAINE 40 MG/G
CREAM TOPICAL
Status: DISCONTINUED | OUTPATIENT
Start: 2017-01-01 | End: 2017-01-01 | Stop reason: HOSPADM

## 2017-01-01 RX ORDER — ONDANSETRON 2 MG/ML
4 INJECTION INTRAMUSCULAR; INTRAVENOUS EVERY 6 HOURS PRN
Status: DISCONTINUED | OUTPATIENT
Start: 2017-01-01 | End: 2017-01-01 | Stop reason: HOSPADM

## 2017-01-01 RX ORDER — ATROPINE SULFATE 10 MG/ML
1-2 SOLUTION/ DROPS OPHTHALMIC
Status: DISCONTINUED | OUTPATIENT
Start: 2017-01-01 | End: 2017-01-01 | Stop reason: HOSPADM

## 2017-01-01 RX ORDER — VANCOMYCIN HYDROCHLORIDE 1 G/200ML
1000 INJECTION, SOLUTION INTRAVENOUS ONCE
Status: COMPLETED | OUTPATIENT
Start: 2017-01-01 | End: 2017-01-01

## 2017-01-01 RX ORDER — NALOXONE HYDROCHLORIDE 0.4 MG/ML
.1-.4 INJECTION, SOLUTION INTRAMUSCULAR; INTRAVENOUS; SUBCUTANEOUS
Status: DISCONTINUED | OUTPATIENT
Start: 2017-01-01 | End: 2017-01-01 | Stop reason: HOSPADM

## 2017-01-01 RX ORDER — IPRATROPIUM BROMIDE AND ALBUTEROL SULFATE 2.5; .5 MG/3ML; MG/3ML
SOLUTION RESPIRATORY (INHALATION)
Status: COMPLETED
Start: 2017-01-01 | End: 2017-01-01

## 2017-01-01 RX ORDER — HALOPERIDOL 5 MG/ML
.5-1 INJECTION INTRAMUSCULAR
Status: DISCONTINUED | OUTPATIENT
Start: 2017-01-01 | End: 2017-01-01 | Stop reason: HOSPADM

## 2017-01-01 RX ORDER — OXYCODONE HYDROCHLORIDE 5 MG/1
5 TABLET ORAL EVERY 8 HOURS
Qty: 30 TABLET | Refills: 0 | Status: SHIPPED | OUTPATIENT
Start: 2017-01-01 | End: 2017-01-01

## 2017-01-01 RX ORDER — GENTAMICIN SULFATE 60 MG/50ML
120 INJECTION, SOLUTION INTRAVENOUS ONCE
Status: DISCONTINUED | OUTPATIENT
Start: 2017-01-01 | End: 2017-01-01 | Stop reason: HOSPADM

## 2017-01-01 RX ORDER — PIPERACILLIN SODIUM, TAZOBACTAM SODIUM 4; .5 G/20ML; G/20ML
4.5 INJECTION, POWDER, LYOPHILIZED, FOR SOLUTION INTRAVENOUS ONCE
Status: COMPLETED | OUTPATIENT
Start: 2017-01-01 | End: 2017-01-01

## 2017-01-01 RX ORDER — SODIUM CHLORIDE 9 MG/ML
1000 INJECTION, SOLUTION INTRAVENOUS CONTINUOUS
Status: DISCONTINUED | OUTPATIENT
Start: 2017-01-01 | End: 2017-01-01

## 2017-01-01 RX ORDER — ONDANSETRON 4 MG/1
4 TABLET, ORALLY DISINTEGRATING ORAL EVERY 30 MIN PRN
Status: DISCONTINUED | OUTPATIENT
Start: 2017-01-01 | End: 2017-01-01 | Stop reason: HOSPADM

## 2017-01-01 RX ORDER — MORPHINE SULFATE 10 MG/5ML
5-10 SOLUTION ORAL
Status: DISCONTINUED | OUTPATIENT
Start: 2017-01-01 | End: 2017-01-01 | Stop reason: HOSPADM

## 2017-01-01 RX ORDER — MORPHINE SULFATE 2 MG/ML
4 INJECTION, SOLUTION INTRAMUSCULAR; INTRAVENOUS ONCE
Status: COMPLETED | OUTPATIENT
Start: 2017-01-01 | End: 2017-01-01

## 2017-01-01 RX ORDER — OXYCODONE HYDROCHLORIDE 5 MG/1
5-10 TABLET ORAL
Status: DISCONTINUED | OUTPATIENT
Start: 2017-01-01 | End: 2017-01-01 | Stop reason: HOSPADM

## 2017-01-01 RX ORDER — ONDANSETRON 2 MG/ML
4 INJECTION INTRAMUSCULAR; INTRAVENOUS EVERY 30 MIN PRN
Status: DISCONTINUED | OUTPATIENT
Start: 2017-01-01 | End: 2017-01-01 | Stop reason: HOSPADM

## 2017-01-01 RX ORDER — OXYCODONE HYDROCHLORIDE 5 MG/1
5 TABLET ORAL EVERY 6 HOURS
Qty: 30 TABLET | Refills: 0 | Status: SHIPPED | OUTPATIENT
Start: 2017-01-01

## 2017-01-01 RX ORDER — VANCOMYCIN HYDROCHLORIDE 1 G/200ML
1000 INJECTION, SOLUTION INTRAVENOUS
Status: DISCONTINUED | OUTPATIENT
Start: 2017-01-01 | End: 2017-01-01 | Stop reason: HOSPADM

## 2017-01-01 RX ORDER — ACETAMINOPHEN 325 MG/1
650 TABLET ORAL
Status: DISCONTINUED | OUTPATIENT
Start: 2017-01-01 | End: 2017-01-01 | Stop reason: HOSPADM

## 2017-01-01 RX ORDER — MORPHINE SULFATE 20 MG/ML
5-10 SOLUTION ORAL
Status: DISCONTINUED | OUTPATIENT
Start: 2017-01-01 | End: 2017-01-01 | Stop reason: HOSPADM

## 2017-01-01 RX ORDER — LEVOFLOXACIN 5 MG/ML
500 INJECTION, SOLUTION INTRAVENOUS
Status: COMPLETED | OUTPATIENT
Start: 2017-01-01 | End: 2017-01-01

## 2017-01-01 RX ORDER — ACETAMINOPHEN 650 MG/1
650 SUPPOSITORY RECTAL EVERY 6 HOURS PRN
Status: DISCONTINUED | OUTPATIENT
Start: 2017-01-01 | End: 2017-01-01 | Stop reason: HOSPADM

## 2017-01-01 RX ORDER — ARGININE/GLUTAMINE/CALCIUM BMB 7G-7G-1.5G
1 POWDER IN PACKET (EA) ORAL 2 TIMES DAILY
Qty: 60 PACKET | Refills: 1 | COMMUNITY
Start: 2017-01-01

## 2017-01-01 RX ORDER — MORPHINE SULFATE 2 MG/ML
2-4 INJECTION, SOLUTION INTRAMUSCULAR; INTRAVENOUS
Status: DISCONTINUED | OUTPATIENT
Start: 2017-01-01 | End: 2017-01-01 | Stop reason: HOSPADM

## 2017-01-01 RX ORDER — PHENYLEPHRINE HCL IN 0.9% NACL 1 MG/10 ML
40 SYRINGE (ML) INTRAVENOUS ONCE
Status: COMPLETED | OUTPATIENT
Start: 2017-01-01 | End: 2017-01-01

## 2017-01-01 RX ORDER — LEVOFLOXACIN 5 MG/ML
500 INJECTION, SOLUTION INTRAVENOUS
Status: CANCELLED | OUTPATIENT
Start: 2017-01-01

## 2017-01-01 RX ORDER — FENTANYL CITRATE 50 UG/ML
INJECTION, SOLUTION INTRAMUSCULAR; INTRAVENOUS PRN
Status: DISCONTINUED | OUTPATIENT
Start: 2017-01-01 | End: 2017-01-01

## 2017-01-01 RX ORDER — METHYLPREDNISOLONE SODIUM SUCCINATE 125 MG/2ML
125 INJECTION, POWDER, LYOPHILIZED, FOR SOLUTION INTRAMUSCULAR; INTRAVENOUS ONCE
Status: COMPLETED | OUTPATIENT
Start: 2017-01-01 | End: 2017-01-01

## 2017-01-01 RX ADMIN — WATER 3000 ML: 100 INJECTION, SOLUTION INTRAVENOUS at 13:50

## 2017-01-01 RX ADMIN — NOREPINEPHRINE BITARTRATE 0.03 MCG/KG/MIN: 1 INJECTION INTRAVENOUS at 02:58

## 2017-01-01 RX ADMIN — FENTANYL CITRATE 25 MCG: 50 INJECTION, SOLUTION INTRAMUSCULAR; INTRAVENOUS at 13:43

## 2017-01-01 RX ADMIN — Medication 40 MCG: at 03:28

## 2017-01-01 RX ADMIN — VANCOMYCIN HYDROCHLORIDE 1 G: 1 INJECTION, SOLUTION INTRAVENOUS at 13:50

## 2017-01-01 RX ADMIN — SODIUM CHLORIDE, POTASSIUM CHLORIDE, SODIUM LACTATE AND CALCIUM CHLORIDE: 600; 310; 30; 20 INJECTION, SOLUTION INTRAVENOUS at 02:16

## 2017-01-01 RX ADMIN — IPRATROPIUM BROMIDE AND ALBUTEROL SULFATE 3 ML: .5; 3 SOLUTION RESPIRATORY (INHALATION) at 02:52

## 2017-01-01 RX ADMIN — SODIUM CHLORIDE, POTASSIUM CHLORIDE, SODIUM LACTATE AND CALCIUM CHLORIDE: 600; 310; 30; 20 INJECTION, SOLUTION INTRAVENOUS at 13:31

## 2017-01-01 RX ADMIN — MORPHINE SULFATE 4 MG: 4 INJECTION, SOLUTION INTRAMUSCULAR; INTRAVENOUS at 04:06

## 2017-01-01 RX ADMIN — WATER 1000 ML: 100 IRRIGANT IRRIGATION at 13:50

## 2017-01-01 RX ADMIN — SODIUM CHLORIDE 1000 ML: 9 INJECTION, SOLUTION INTRAVENOUS at 01:50

## 2017-01-01 RX ADMIN — PIPERACILLIN AND TAZOBACTAM 4.5 G: 4; .5 INJECTION, POWDER, FOR SOLUTION INTRAVENOUS at 02:27

## 2017-01-01 RX ADMIN — SODIUM CHLORIDE 500 ML: 9 INJECTION, SOLUTION INTRAVENOUS at 03:21

## 2017-01-01 RX ADMIN — PHENYLEPHRINE HYDROCHLORIDE 200 MCG: 10 INJECTION, SOLUTION INTRAMUSCULAR; INTRAVENOUS; SUBCUTANEOUS at 13:28

## 2017-01-01 RX ADMIN — MORPHINE SULFATE 4 MG: 2 INJECTION, SOLUTION INTRAMUSCULAR; INTRAVENOUS at 04:29

## 2017-01-01 RX ADMIN — SODIUM CHLORIDE, POTASSIUM CHLORIDE, SODIUM LACTATE AND CALCIUM CHLORIDE 2097 ML: 600; 310; 30; 20 INJECTION, SOLUTION INTRAVENOUS at 02:05

## 2017-01-01 RX ADMIN — FENTANYL CITRATE 25 MCG: 50 INJECTION, SOLUTION INTRAMUSCULAR; INTRAVENOUS at 13:38

## 2017-01-01 RX ADMIN — LEVOFLOXACIN 500 MG: 5 INJECTION, SOLUTION INTRAVENOUS at 13:37

## 2017-01-01 RX ADMIN — SODIUM BICARBONATE 50 MEQ: 84 INJECTION, SOLUTION INTRAVENOUS at 02:48

## 2017-01-01 RX ADMIN — METHYLPREDNISOLONE SODIUM SUCCINATE 125 MG: 125 INJECTION, POWDER, FOR SOLUTION INTRAMUSCULAR; INTRAVENOUS at 02:48

## 2017-01-12 NOTE — PROGRESS NOTES
The note is generated in the [facility] radiation oncology record and verify system and it will be transferred to the EMR via interface.

## 2017-01-12 NOTE — PATIENT INSTRUCTIONS
Continuing Management of the Effects of Radiation Treatment    The side effects of radiation therapy should gradually decrease in 2 to 3 weeks after you have finished radiation.  Some effects take longer to resolve.    Skin reactions:  Skin changes (such as redness or irritation) should begin to get better gradually.  Some people will have a permanent change in skin color.  Their skin may be more pink or  tan  than the untreated skin.  The skin may be thinner or more fragile than before treatment.  Continue to use a gentle moisturizing lotion for several months.  You should always protect the skin in the area that was treated by using sunscreen of spf 30 or higher.      Other skin care instructions:    Fatigue caused by radiation therapy will decrease and your energy will improve.    For concerns or questions call Department of Therapeutic Radiology [phone]

## 2017-01-12 NOTE — Clinical Note
1/12/2017       RE: Adwoa Peralta  C/O [facility]  [address]  [facility] [address]     Dear Colleague,    Thank you for referring your patient, Adwoa Peralta, to the RADIATION ONCOLOGY CLINIC. Please see a copy of my visit note below.    The note is generated in the [facility] radiation oncology record and verify system and it will be transferred to the EMR via interface.    Again, thank you for allowing me to participate in the care of your patient.      Sincerely,    Iggy Dubose MD

## 2017-01-12 NOTE — PROCEDURES
[facility]        MR#: [id number]        STEREOTACTIC BODY RADIOTHERAPY TREATMENT (SBRT) NOTE        DATE OF SERVICE:  1/10/2017            Diagnosis: C34.32 Malignant neoplasm of lower lobe, left bronchus or lung.  c T1a  N0  M0  .     Medical Indication for SBRT: The patient has medically inoperable Right Non-small cell  lung   cancer due to poor pulmonary function.     Narrative:  This is a treatment note for 4th fraction of the 5 fraction SBRT for right non-small cell   lung   cancer. There was a break after 3 fractions due to inpatient management of sepsis. She has been   discharged since in stable condition.     The patient was positioned in the custom made immobilization in the [employer] Stereotactic body   frame allowing X, Y, Z coordinates to be verified. The patient had a cone beam CT scan today   prior to treatment to verify the new central axis coordinates.       The new XYZ coordinates has shifted to ?  0.1 cm X(lateral), ?  0.9 cm Y(longitudinal), and ?   0.0 cm Z(vertical).  After the cone beam CT verifications, the CAX set up was on the treatment   machine (True-Beam), the patient had treatment delivered with 10 non-coplanar beams. The   patient had 1000 cGy delivered to the 85% isodose line with heterogeneity corrections with a 6   MV photons.  A second cone beam was done midway to verify the set up.     No complications were encountered.  There were no complaint or skin reactions seen. The   patient tolerated the therapy well and left the department in stable condition and will return for   the 5th fraction of total 5 on January 12, 2017.       MARLENY Dubose M.D.   Department of Therapeutic Radiology

## 2017-01-12 NOTE — MR AVS SNAPSHOT
After Visit Summary   1/12/2017    Adwoa Peralta    MRN: [id number]           Patient Information     Date Of Birth          1940        Visit Information        Provider Department      1/12/2017 2:45 PM Iggy Dubose MD Radiation Oncology Clinic        Today's Diagnoses     Malignant neoplasm of lower lobe of left lung (H)    -  1       Care Instructions    Continuing Management of the Effects of Radiation Treatment    The side effects of radiation therapy should gradually decrease in 2 to 3 weeks after you have finished radiation.  Some effects take longer to resolve.    Skin reactions:  Skin changes (such as redness or irritation) should begin to get better gradually.  Some people will have a permanent change in skin color.  Their skin may be more pink or  tan  than the untreated skin.  The skin may be thinner or more fragile than before treatment.  Continue to use a gentle moisturizing lotion for several months.  You should always protect the skin in the area that was treated by using sunscreen of spf 30 or higher.      Other skin care instructions:    Fatigue caused by radiation therapy will decrease and your energy will improve.    For concerns or questions call Department of Therapeutic Radiology [phone]        Follow-ups after your visit        Follow-up notes from your care team     Return in about 3 months (around 4/12/2017).      Your next 10 appointments already scheduled     Mar 31, 2017 10:40 AM   CT CHEST W/O CONTRAST with UUCT1   [facility], [place], [place] ([facility], [facility])    [address] [place] [id number] [phone]           Please bring any scans or X-rays taken at other hospitals, if similar tests were done. Also bring a list of your medicines, including vitamins, minerals and over-the-counter drugs. It is safest to leave personal items at home.  Be sure to tell your doctor:   If you have any allergies.    If there s any chance you are pregnant.   If you are breastfeeding.   If you have any special needs.  You do not need to do anything special to prepare.  Please wear loose clothing, such as a sweat suit or jogging clothes. Avoid snaps, zippers and other metal. We may ask you to undress and put on a hospital gown.            2017 10:00 AM   Return Visit with Iggy Dubose MD   Radiation Oncology Clinic ([facility])    [facility]  1st Floor  [address] [facility] [address]   [phone]              Future tests that were ordered for you today     Open Future Orders        Priority Expected Expires Ordered    CT Chest w/o contrast Routine  2018            Who to contact     Please call your clinic at [phone] to:    Ask questions about your health    Make or cancel appointments    Discuss your medicines    Learn about your test results    Speak to your doctor   If you have compliments or concerns about an experience at your clinic, or if you wish to file a complaint, please contact [facility] Patient Relations at [phone] or email us at [email or web address]         Additional Information About Your Visit        [employer] Information     [employer] is an electronic gateway that provides easy, online access to your medical records. With Topic, you can request a clinic appointment, read your test results, renew a prescription or communicate with your care team.     To sign up for [employer] visit the website at [email or web address]   You will be asked to enter the access code listed below, as well as some personal information. Please follow the directions to create your username and password.     Your access code is: [email or web address]  Expires: 4/3/2017  8:42 AM     Your access code will  in 90 days. If you need help or a new code, please contact your [facility] or  call [phone] for assistance.        Care EveryWhere ID     This is your Care EveryWhere ID. This could be used by other organizations to access your [facility] medical records  [id number]         Blood Pressure from Last 3 Encounters:   01/05/17 118/52   12/21/16 128/76   12/19/16 128/76    Weight from Last 3 Encounters:   12/27/16 91.037 kg (200 lb 11.2 oz)   12/21/16 84.732 kg (186 lb 12.8 oz)   12/19/16 85.548 kg (188 lb 9.6 oz)               Primary Care Provider    None Specified       No primary provider on file.        Thank you!     Thank you for choosing RADIATION ONCOLOGY CLINIC  for your care. Our goal is always to provide you with excellent care. Hearing back from our patients is one way we can continue to improve our services. Please take a few minutes to complete the written survey that you may receive in the mail after your visit with us. Thank you!             Your Updated Medication List - Protect others around you: Learn how to safely use, store and throw away your medicines at www.disposemymeds.org.          This list is accurate as of: 1/12/17 11:59 PM.  Always use your most recent med list.                   Brand Name Dispense Instructions for use    acetaminophen 325 MG tablet    TYLENOL     Take 1,000 mg by mouth 3 times daily AND DAILY PRN       AMPYRA 10 MG Tb12   Generic drug:  Dalfampridine      Take 10 mg by mouth daily       arginine Pack      Take 1 packet by mouth 2 times daily       Calcium Carb-Cholecalciferol 500-200 MG-UNIT Tabs      Take 1 tablet by mouth 2 times daily       ceFAZolin 1 GM vial    ANCEF    1 each    Inject 2 g into the vein every 8 hours for 16 days CBC with differential, creatinine, SGOT weekly while on this medication to be faxed to Dr. Toure office.       CITALOPRAM HYDROBROMIDE PO      Take 20 mg by mouth daily       enoxaparin 40 MG/0.4ML injection    LOVENOX     Inject 0.4 mLs (40 mg) Subcutaneous every 24 hours       GABAPENTIN PO      Take 600 mg by  mouth 2 times daily       LIPITOR PO      Take 10 mg by mouth At Bedtime       metoprolol 25 MG tablet    LOPRESSOR    60 tablet    Take 1 tablet (25 mg) by mouth 2 times daily       miconazole 2 % powder    MICATIN; MICRO GUARD     Apply topically every hour as needed for other (topical candidiasis)       MULTIVITAL PO      Take 1 tablet by mouth every morning       oxyCODONE 5 MG IR tablet    ROXICODONE     Take 1 tablet (5 mg) by mouth every 12 hours       polyethylene glycol powder    MIRALAX/GLYCOLAX     Take 17 g by mouth 2 times daily       traMADol 50 MG tablet    ULTRAM    28 tablet    Take 1 tablet (50 mg) by mouth 2 times daily

## 2017-01-13 NOTE — PROCEDURES
[facility]        MR#: [id number]        STEREOTACTIC BODY RADIOTHERAPY TREATMENT (SBRT) NOTE        DATE OF SERVICE:  1/12/2017            Diagnosis: C34.32 Malignant neoplasm of lower lobe, left bronchus or lung.  c T1a  N0  M0  .     Medical Indication for SBRT: The patient has medically inoperable Right Non-small cell  lung   cancer due to poor pulmonary function.     Narrative:  This is a treatment note for 5th fraction of the 5 fraction SBRT for right non-small cell   lung   cancer. There was a break after 3 fractions due to inpatient management of sepsis. She has been   discharged since in stable condition.     The patient was positioned in the custom made immobilization in the [employer] Stereotactic body   frame allowing X, Y, Z coordinates to be verified. The patient had a cone beam CT scan today   prior to treatment to verify the new central axis coordinates.       The new XYZ coordinates has shifted to ?  0.7 cm X(lateral), ?  0.2 cm Y(longitudinal), and ?   1.1 cm Z(vertical).  After the cone beam CT verifications, the CAX set up was on the treatment   machine (True-Beam), the patient had treatment delivered with 10 non-coplanar beams. The   patient had 1000 cGy delivered to the 85% isodose line with heterogeneity corrections with a 6   MV photons.  A second cone beam was done midway to verify the set up.     No complications were encountered.  There were no complaint or skin reactions seen. The   patient tolerated the therapy well and left the department in stable condition and will return for a   follow up with a non-contrast CT scan of chest in 3 months.       MARLENY Dubose M.D.   Department of Therapeutic Radiology

## 2017-01-16 NOTE — TELEPHONE ENCOUNTER
Nurse called requesting adjustment in pain medication. She has a history of chronic pain. Pain medication significantly tapered during her hospital stay due to altered mental status. Nurse reports that she is refusing to get out of bed and yelling out in pain. She is currently managed on Oxycodone 5 mg Q 12 hours for pain. Nurse is also requesting an order to discontinue olguin catheter.    Assessment/Plan:  Increase Oxycodone 5 mg po Q 8 hours for pain. Continue to monitor mental status.  D/C Olguin catheter.  Follow up appointment 1/17/17.    Araseli Brito NP

## 2017-01-17 NOTE — PROGRESS NOTES
[facility]  INITIAL VISIT NOTE  January 17, 2017    PRIMARY CARE PROVIDER AND CLINIC:  No primary care provider on file. No primary physician on file.    Chief Complaint   Patient presents with     Hospital F/U       HPI:    Adwoa Peralta is a 76 year old  (1940) female who was seen at [facility] on January 17, 2017 for an initial visit. Medical history is notable for MS, DM II, peripheral neuropathy and a RLL non small cell lung cancer s/p 5 fractions of palliative XRT. She was hospitalized at [facility] from 12/24/16 to 1/5/17 where she presented with hypoxia, AMS, RLL infiltrate and pyuria and was treated for severe sepsis secondary to Proteus UTI. Hospital course was complex. TTE with EF 35-40% thought secondary to stress cardiomyopathy in setting of sepsis and she was started on metoprolol. CT abdomen with ureteral stones and she had a right ureteral stent placed 12/27/16.  Blood cultures grew MSSA and group B strep of unclear etiology. Plan is for total of four weeks of cefazolin per ID. The CT abdomen/pelvis was ordered due to ongoing pain several months after the bilateral femur fractures and received a homogenous mass replacing the left vastus lateralis muscle deep to the IT band. MRI demonstrated a hematoma thought secondary to warfarin. She is now on enoxaparin. ID and orthopedic surgery recommended conservative management. Her narcoctics were significantly tapered down during the hospitalization. She was re-admitted to this facility for medical management and long-term care.     Today, Ms. Peralta is seen in her room. Main issue since return from the hospital has been pain control. Per nursing, mental status has cleared. Pain is mostly with cares. Adwoa says the oxycodone is working, but doesn't last long enough. No chest pain or dyspnea. No abdominal pain.     CODE STATUS:   DNR / DNI    ALLERGIES:     Allergies   Allergen Reactions     Dilaudid  [Hydromorphone] Hives     Per patient      Questran [Cholestyramine] Rash     Itchy rash       PAST MEDICAL HISTORY:   Past Medical History   Diagnosis Date     Neuromuscular disorder (H)      Hypertension      Vitamin D deficiency      Diabetes (H)      new dx 8/2016     Lung nodule      Multiple sclerosis (H)        PAST SURGICAL HISTORY:   Past Surgical History   Procedure Laterality Date     Gyn surgery       Decorticate lung  7/1/2014     Procedure: DECORTICATE LUNG;  Surgeon: Eyal Echols MD;  Location:  OR     Thoracotomy  7/1/2014     Procedure: THORACOTOMY;  Surgeon: Eyal Echols MD;  Location:  OR     Lobectomy lung  7/1/2014     Procedure: LOBECTOMY LUNG;  Surgeon: Eyal Echols MD;  Location:  OR     Cystoscopy, retrogrades, insert stent ureter(s), combined Right 12/27/2016     Procedure: COMBINED CYSTOSCOPY, RETROGRADES, INSERT STENT URETER(S);  Surgeon: Quinn Hawthorne MD;  Location:  OR     Combined cystoscopy, insert stent ureter(s) Right 12/27/2016     Procedure: COMBINED CYSTOSCOPY, INSERT STENT URETER(S);  Surgeon: Quinn Hawthorne MD;  Location:  OR       FAMILY HISTORY:   No family history on file.    SOCIAL HISTORY:   [facility] in [facility]    MEDICATIONS:  Current Outpatient Prescriptions   Medication Sig Dispense Refill     ceFAZolin (ANCEF) 1 GM vial Inject 2 g into the vein every 8 hours for 16 days CBC with differential, creatinine, SGOT weekly while on this medication to be faxed to Dr. Toure office. 1 each      oxyCODONE (ROXICODONE) 5 MG IR tablet Take 1 tablet (5 mg) by mouth every 12 hours (Patient taking differently: Take 5 mg by mouth every 6 hours as needed )  0     enoxaparin (LOVENOX) 40 MG/0.4ML injection Inject 0.4 mLs (40 mg) Subcutaneous every 24 hours       metoprolol (LOPRESSOR) 25 MG tablet Take 1 tablet (25 mg) by mouth 2 times daily 60 tablet      miconazole (MICATIN; MICRO GUARD) 2 % powder Apply topically every hour  "as needed for other (topical candidiasis)       arginine (ARGINAID) PACK Take 1 packet by mouth 2 times daily       GABAPENTIN PO Take 600 mg by mouth 2 times daily       Multiple Vitamins-Minerals (MULTIVITAL PO) Take 1 tablet by mouth every morning       Calcium Carb-Cholecalciferol 500-200 MG-UNIT TABS Take 1 tablet by mouth 2 times daily       Atorvastatin Calcium (LIPITOR PO) Take 10 mg by mouth At Bedtime        acetaminophen (TYLENOL) 325 MG tablet Take 1,000 mg by mouth 3 times daily        polyethylene glycol (MIRALAX/GLYCOLAX) powder Take 17 g by mouth 2 times daily        Dalfampridine (AMPYRA) 10 MG TB12 Take 10 mg by mouth daily        CITALOPRAM HYDROBROMIDE PO Take 20 mg by mouth daily         Post Discharge Medication Reconciliation Status: discharge medications reconciled, continue medications without change except as noted in A/P below.     ROS:  10 point ROS neg other than the symptoms noted above in the HPI.    PHYSICAL EXAM:  /66 mmHg  Pulse 85  Temp(Src) 96.6  F (35.9  C)  Resp 16  Ht 5' 6\" (1.676 m)  Wt 207 lb 6.4 oz (94.076 kg)  BMI 33.49 kg/m2  SpO2 94%  Gen: sitting up in bed, alert, cooperative and in no acute distress  HEENT: normocephalic; oropharynx clear  Card: RRR, S1, S2, no murmurs; PICC with dressing c/d/i in LUE  Resp: lungs clear to auscultation bilaterally, no crackles or wheezes  GI: abdomen soft, not-tender, non-distended  MSK: decreased muscle tone, unna boots in place; RUE edema  Neuro: CX II-XII grossly in tact; ROM in all four extremities grossly in tact  Psych: alert and oriented to self and general situation; normal affect    LABORATORY/IMAGING DATA:  Reviewed as per Epic    ASSESSMENT/PLAN:    Sepsis Secondary to Proteus UTI  MSSA & Grp B Strep Bacteremia  Unclear source for bacteremia. TTE without vegetations. ID consulted during hospitalization.   -- completing course of cefazolin per ID via LUE PICC (last day 1/21/17)  -- labs faxed weekly to ID  -- " follow for clinical signs of infection    Right Ureteral Stones s/p R Ureteral Stent (12/27/16)  Discharged with Lugo in place - this was d/c at facility on 1/16/17.  -- unclear this morning how trial of void has gone  -- completing abx as above  -- needs follow up with Dr. Hawthorne as scheduled on 1/31/17      Acute Toxic/Metabolic Encephalopathy  In setting of sepsis. Had reduction in narcotics during hospitalization. I have only met her once. Still seems a little fuzzy cognitively to me, but nursing feels she is nearing her baseline.   -- treat infection as above  -- supportive cares     Stress Induced Cardiomyopathy (EF 35-40%)  Likely stunned myocardium in setting of sepsis. New on metoprolol and lisinopril (12/30). Discharged on only metoprolol. SBPs 110s-140s, most 120s-130s. Weight stable at 207 lbs.   -- continues on atorvastatin 10 mg qhs, metoprolol 25 mg BID  -- will need repeat TTE w/ cardiology (vs we can order this and manage in house)    Left Thigh Hematoma  In setting of bilateral femur fractures (July 2016). MRI with hematoma along the left vastus lateralis muscleproximally to the level of the greater trochanter thought secondary to warfarin. ID and orthopedic surgery recommended conservative managenment.   -- follow clinically -- will take weeks/months to fully resorb     Acute on Chronic Anemia  Baseline Hgb labile but was 12 last fall. Transfused 2 units PRBCs for violeta Hgb 7.1 and Hgb 8.2 on 1/6/17. Etiology of acute anemia felt multifactorial from thigh hematoma and marrow suppression due to sepsis.   -- follow clinically     Dysphagia  In setting of critical illness, hospitalization and underlying medication conditions.   -- continues on pureed diet with NTL  -- SLP following - advance diet as per them     Chronic Bilateral Knee Pain   Bilateral Femur Fractures (July 2016)  Narcotics reduced during hospitalization as concern they were contributing to her encephalopathy. PTA on OxyContin 20 mg  BID and oxycodone 5-10 mg q4h PRN.    -- continues on acetaminophen 1000 mg TID and gabapentin 600 mg BID  -- increase oxycodone to 5 mg q6h PRN  -- d/c tramadol    DVT  No longer on warfarin given thigh hematoma. Now on enoxaparin.   -- continues on enoxaparin 40 mg subQ daily     Right Lower Lower Non-Small Cell Lung Cancer  Inoperable. Completed 5 of 5 fractions of palliative XRT on 1/12/17.   -- follow up with oncology as per them     MS  Non-ambulatory at baseline. Davy lift for transfers.   -- continues on ampyra 10 mg daily     DM, Type II  Hgb A1c 6.3 in November. Diet controlled.   -- follow clinically    Depression/Anxiety  -- continues on citalopram 20 mg daily  -- supportive cares      Electronically signed by:  Natalya Manjarrez MD

## 2017-01-20 NOTE — TELEPHONE ENCOUNTER
Nurse called to report a C02 of 45. Past C02 35-38. Nurse reports shortness of breath at baseline. She has orthopnea. She has a history of CHF and Lung cancer. O2 sats currently 89-90% on room air. Nurse reports pitting edema to bilateral lower extremities. BP 11/65 98. O2 sats this morning 95%.    Assessment/Plan: CHF, Lung cancer  STAT PA LAT CXR  Give 20 mg po Lasix now and Lasix 20 mg po Q AM  BNP and BMP on Monday 1/23/17    Araseli Brito NP

## 2017-01-24 NOTE — PROGRESS NOTES
"[place] [facility]    Chief Complaint   Patient presents with     RECHECK       HPI:      Adwoa Peralta is a 76 year old  (1940), who is being seen today for an episodic care visit at [facility]. Today's concern is:    Lung infiltrate  H/O sepsis  Per previous notes \"76 year old  (1940) female with hx MS, DM II, peripheral neuropathy and a LLL non small cell lung cancer s/p 5 fractions of palliative XRT. She was hospitalized at [facility] from 12/24/16 to 1/5/17 where she presented with hypoxia, AMS, RLL infiltrate and pyuria and was treated for severe sepsis secondary to Proteus UTI. Hospital course was complex. TTE with EF 35-40% thought secondary to stress cardiomyopathy in setting of sepsis and she was started on metoprolol. CT abdomen with ureteral stones and she had a right ureteral stent placed 12/27/16.  Blood cultures grew MSSA and group B strep of unclear etiology. Plan is for total of four weeks of cefazolin per ID. The CT abdomen/pelvis was ordered due to ongoing pain several months after the bilateral femur fractures and received a homogenous mass replacing the left vastus lateralis muscle deep to the IT band. MRI demonstrated a hematoma thought secondary to warfarin. She is now on enoxaparin. ID and orthopedic surgery recommended conservative management. Her narcotics were significantly tapered down during the hospitalization. She was re-admitted to this facility for medical management and long-term care\".   --on 1/20 patient developed hypoxia. CXR obtained and shoed diffuse patchy infiltrate right lung and pleural fluid. She was started on Lasix 20 mg PO daily as well as Levaquin 500 mg PO daily x 7 days. She had labs checked that showed Na 141, BUN 13. Cr 0.48 and K 3.6 on 1/23/17. BNP was elevated at 965 on 1/23. Patient feels breathing is at baseline today, denies cough. Crackles and diminished sounds right lower lobe. Weight decreased as noted " below.  Recent Weights: 196.6lbs (today) and previously 208lbs, 208.4lbs, 207.4lbs, 178lbs, 177.8lbs, 186.5lbs  Wt Readings from Last 2 Encounters:   01/24/17 197 lb 9.6 oz (89.631 kg)   01/13/17 207 lb 6.4 oz (94.076 kg)       Ureteral stone  Apparently is now s/p stent placement. Due to see Dr Hawthorne for follow up next week. Lugo out and voiding well. Monitor.     Encephalopathy  Resolved; appears to be at baseline.     Stress-induced cardiomyopathy  Edema, unspecified type  Recent BP Ranges: 101/142-53/88, weight down since lasix added. Continues statin and metoprolol as well. Will re-check renal labs next week.     Anemia, unspecified type  Deep vein thrombosis (DVT) of proximal lower extremity, unspecified chronicity, unspecified laterality (H)  Developed leg hematoma while on Coumadin and this is now stopped. Last HGB      8.2   1/6/2017 and HGB      7.7   1/5/2017; no s/s bleeding at this time. Continues on enoxaparin.     Other chronic pain  Appears more comfortable today. On Oxycodone 5 mg PO every 6 hrs PRN pain. Also takes scheduled Tylenol 1000 mg PO TID, no PRN tylenol. Monitor.     Non-small cell cancer of left lung (H)  Completed radiation therapy. Due for f/u in 3 months with Dr Dubose.     Type 2 diabetes mellitus with diabetic neuropathy, without long-term current use of insulin (H)  Chronic. A1C      6.3   11/1/2016 and diet controlled. F/U PRN. BG         ALLERGIES: Dilaudid and Questran  Past Medical, Surgical, Family and Social History reviewed and updated in [facility].    Current Outpatient Prescriptions   Medication Sig Dispense Refill     Furosemide (LASIX PO) Take 20 mg by mouth daily       LevoFLOXacin (LEVAQUIN PO) Take 500 mg by mouth every morning       oxyCODONE (ROXICODONE) 5 MG IR tablet Take 1 tablet (5 mg) by mouth every 12 hours (Patient taking differently: Take 5 mg by mouth every 6 hours as needed )  0     enoxaparin (LOVENOX) 40 MG/0.4ML injection Inject 0.4 mLs (40 mg) Subcutaneous  every 24 hours       metoprolol (LOPRESSOR) 25 MG tablet Take 1 tablet (25 mg) by mouth 2 times daily 60 tablet      miconazole (MICATIN; MICRO GUARD) 2 % powder Apply topically every hour as needed for other (topical candidiasis)       arginine (ARGINAID) PACK Take 1 packet by mouth 2 times daily       GABAPENTIN PO Take 600 mg by mouth 2 times daily       Multiple Vitamins-Minerals (MULTIVITAL PO) Take 1 tablet by mouth every morning       Calcium Carb-Cholecalciferol 500-200 MG-UNIT TABS Take 1 tablet by mouth 2 times daily       Atorvastatin Calcium (LIPITOR PO) Take 10 mg by mouth At Bedtime        acetaminophen (TYLENOL) 325 MG tablet Take 1,000 mg by mouth 3 times daily        polyethylene glycol (MIRALAX/GLYCOLAX) powder Take 17 g by mouth 2 times daily        Dalfampridine (AMPYRA) 10 MG TB12 Take 10 mg by mouth daily        CITALOPRAM HYDROBROMIDE PO Take 20 mg by mouth daily       Medications reviewed:  Medications reconciled to facility chart and changes were made to reflect current medications as identified as above med list. Below are the changes that were made:   Medications stopped since last EPIC medication reconciliation:   There are no discontinued medications.    Medications started since last [facility] medication reconciliation:  Orders Placed This Encounter   Medications     Furosemide (LASIX PO)     Sig: Take 20 mg by mouth daily     LevoFLOXacin (LEVAQUIN PO)     Sig: Take 500 mg by mouth every morning       REVIEW OF SYSTEMS:  10 point ROS of systems including Constitutional, Eyes, Respiratory, Cardiovascular, Gastroenterology, Genitourinary, Integumentary, Musculoskeletal, Psychiatric were all negative except for pertinent positives noted in my HPI.    Physical Exam:  /65 mmHg  Pulse 90  Temp(Src) 97.6  F (36.4  C)  Resp 19  Wt 197 lb 9.6 oz (89.631 kg)  SpO2 6%  GENERAL APPEARANCE:  Alert, in no distress, pleasant, cooperative, oriented x self and place and recent events.   EYES:   EOM, lids, pupils and irises normal, sclera clear and conjunctiva normal, no discharge or mattering on lids or lashes noted  ENT:  Mouth normal, moist mucous membranes, nose normal without drainage or crusting, external ears without lesions, hearing acuity intact  RESP:  respiratory effort and palpation of chest normal, no chest wall tenderness, no respiratory distress, Lung sounds diminished right lobe base with crackles, patient is on room air  CV:  Palpation and auscultation of heart done, rate and rhythm controlled and regular, no murmur, no rub or gallop. Edema +3 pitting bilateral lower extremities.   ABDOMEN:  normal bowel sounds, soft, nontender, no grimacing or guarding with palpation, no hepatosplenomegaly or other masses  M/S:   Gait and station wheelchair bound, Digits and nails normal. Generalized pain lower legs. Unable to move legs with hx MS  SKIN:  Inspection and palpation of skin and subcutaneous tissue: skin on buttocks warm, dry and intact without rashes or open areas   NEURO: cranial nerves 2-12 grossly intact, no facial asymmetry, no speech deficits and able to follow directions, LE weakness chronic with known MS  PSYCH:  insight and judgement at baseline, memory forgetful, affect and mood normal     Recent Labs:    CBC RESULTS:   Recent Labs   Lab Test 01/06/17 01/05/17   0737   WBC  11.8*  11.5*   RBC  2.68*  2.55*   HGB  8.2*  7.7*   HCT  25.7*  23.7*   MCV  96  93   MCH  31  30.2   MCHC  32*  32.5   RDW  16.6*  16.3*   PLT  301  311       Last Basic Metabolic Panel:  Recent Labs   Lab Test 01/23/17 01/06/17 01/04/17   0714   NA  141   --   138   POTASSIUM  3.6   --   3.5   CHLORIDE  106   --   106   CAITLYN  8.7   --   7.9*   CO2  27   --   24   BUN  13   --   8   CR  0.48*  0.52*  0.46*   GLC  86   --   105*       Liver Function Studies -   Recent Labs   Lab Test 01/06/17 01/04/17   0714  12/24/16   1115   PROTTOTAL   --   5.0*  7.2   ALBUMIN   --   1.2*  1.5*   BILITOTAL   --   0.2  0.3    ALKPHOS   --   101  158*   AST  14  13  30   ALT   --   <6  34       TSH   Date Value Ref Range Status   05/04/2016 1.67 mcU/mL Final       A1C      6.3   11/1/2016  A1C      6.6   7/20/2016    Assessment/Plan:  Lung infiltrate  H/O sepsis  Acute onset of symptoms with recent sepsis. Completed IV antibiotics, then worsened respiratory symptoms and positive CXR. Started on Levaquin and Lasix, resp symptoms improved.     Ureteral stone  Now s/p stent. Due to f/u with urology next week.     Encephalopathy  Improved/resolved. Monitor.     Stress-induced cardiomyopathy  Edema  Chronic edema; resp symptoms as above - improved with addition of Lasix, monitor.     Anemia, unspecified type  Acute on chronic. F/U with Hgb next week.     Other chronic pain  Chronic; appears more comfortable with Oxycodone every 6 hrs PRN and Tylenol scheduled. Monitor.     Deep vein thrombosis (DVT) of proximal lower extremity, unspecified chronicity, unspecified laterality (H)  Chronic. Now on Lovenox. F/U PRN.     Type 2 diabetes mellitus with diabetic neuropathy, without long-term current use of insulin (H)  Chronic, diet controlled. Check BG weekly and f/u with next routine A1C.     Non-small cell cancer of left lung (H)  Completed radiation therapy. F/U with Dr Dubose in three months as ordered.     Orders:  1. Check BMP, CBC on 1/31/17  2. Elevate legs BID x 1 hr when in bed diagnosis edema  3. Weights 2 x weekly diagnosis edema  4. Check BG weekly at alternating times diagnosis DM2    Time 35 minutes    Electronically signed by  RE Kaufman CNP

## 2017-01-30 NOTE — PROGRESS NOTES
"[place] [facility]    Chief Complaint   Patient presents with     RECHECK       HPI:    Adwoa Peralta is a 76 year old  (1940), who is being seen today for an episodic care visit at [facility]. Today's concern is:  Chronic pain of both lower extremities  Patient with chronic pain both lower legs. Due to encephalopathy, weakness was taken off scheduled narcotic.Continues with PRN oxycodone in addition to scheduled Tylenol. She forgets to report pain and nursing staff is not staying on top of assessing pain/need for PRN meds. Patient often screams out in pain, often reports severe pain but most of the time is only given one PRN oxycodone daily.     REVIEW OF SYSTEMS:  Unobtainable secondary to cognitive impairment or aphasia, but today pt reports pain    OBJECTIVE:   /94 mmHg  Pulse 97  Temp(Src) 97.2  F (36.2  C)  Resp 18  Ht 5' 6\" (1.676 m)  Wt 192 lb 3.2 oz (87.181 kg)  BMI 31.04 kg/m2  SpO2 96%  GENERAL APPEARANCE:  Alert, in no distress, oriented to self and place.   On room air, no dyspnea.   No facial asymmetry. LE generalized weakness due to known MS      ASSESSMENT/PLAN:  Chronic pain of both lower extremities  Chronic issue , not well controlled. Will change Oxycodone to the following:  - oxyCODONE (ROXICODONE) 5 MG IR tablet; Take 1 tablet (5 mg) by mouth every 8 hours And every 6 hrs PRN      Time 15 minutes    Electronically signed by RE Kafuman GNP   "

## 2017-01-31 NOTE — PROGRESS NOTES
[place] [facility]    Chief Complaint   Patient presents with     RECHECK       HPI:    Adwoa Peralta is a 76 year old  (1940), who is being seen today for an episodic care visit [facility]. Today's concern is:    Hypoxia  Non-small cell cancer of left lung (H)  Generalized muscle weakness  Patient known lung CA, became hypoxic on her way back from urology follow up on  with sats down to 78% on return to TCU. Started on O2 and saturations now over 90%, using 1-3 liters per NC. Lungs congested throughout. Recently treated for suspected pneumonia, previously hospitalized with sepsis (Dec/Jagdeep). She has been afebrile. She does have occasional cough. Also has dysphagia and was fed the wrong consistency diet earlier this week per staff without noticeable aspiration event. WEIGHT: 17 208.4 LBS AND 17 192.8 LBS and BP: 101-142/  B17 152 (NO RANGE)    Dysuria  Patient saw urology for f/u but they were not able to complete the visit as she was unaccompanied. She is scheduled for COMBINED CYSTOSCOPY, URETEROSCOPY, LASER HOLMIUM LITHOTRIPSY URETER(S), INSERT STENT on 17 and will need a pre-op. Reports pain with urination.     Other chronic pain  Chronic issue due to neuropathy, MS, bilateral femur fractures in 2016. Patient is now on scheduled Oxycodone 5 mg PO every 8 hrs and appears more comfortable. She also has PRN oxycodone available. Higher narcotic doses were felt to contribute to confusion and somnolence.     Acute deep vein thrombosis (DVT) of tibial vein of left lower extremity (H)  Diagnosed in December. Did not tolerate oral anticoagulation and is now on Lovenox 40 mg SQ daily.     ALLERGIES: Dilaudid and Questran  Past Medical, Surgical, Family and Social History reviewed and updated in [facility].    Current Outpatient Prescriptions   Medication Sig Dispense Refill     oxyCODONE (ROXICODONE) 5 MG IR tablet Take 1 tablet (5 mg) by mouth every 8 hours  "And every 6 hrs PRN 30 tablet 0     Furosemide (LASIX PO) Take 20 mg by mouth daily       LevoFLOXacin (LEVAQUIN PO) Take 500 mg by mouth every morning       enoxaparin (LOVENOX) 40 MG/0.4ML injection Inject 0.4 mLs (40 mg) Subcutaneous every 24 hours       metoprolol (LOPRESSOR) 25 MG tablet Take 1 tablet (25 mg) by mouth 2 times daily 60 tablet      miconazole (MICATIN; MICRO GUARD) 2 % powder Apply topically every hour as needed for other (topical candidiasis)       arginine (ARGINAID) PACK Take 1 packet by mouth 2 times daily       GABAPENTIN PO Take 600 mg by mouth 2 times daily       Multiple Vitamins-Minerals (MULTIVITAL PO) Take 1 tablet by mouth every morning       Calcium Carb-Cholecalciferol 500-200 MG-UNIT TABS Take 1 tablet by mouth 2 times daily       Atorvastatin Calcium (LIPITOR PO) Take 10 mg by mouth At Bedtime        acetaminophen (TYLENOL) 325 MG tablet Take 1,000 mg by mouth 3 times daily        polyethylene glycol (MIRALAX/GLYCOLAX) powder Take 17 g by mouth 2 times daily        Dalfampridine (AMPYRA) 10 MG TB12 Take 10 mg by mouth daily        CITALOPRAM HYDROBROMIDE PO Take 20 mg by mouth daily       Medications reviewed:  Medications reconciled to facility chart and changes were made to reflect current medications as identified as above med list. Below are the changes that were made:   Medications stopped since last EPIC medication reconciliation:   There are no discontinued medications.    Medications started since last [facility] medication reconciliation:  No orders of the defined types were placed in this encounter.     REVIEW OF SYSTEMS:  10 point ROS of systems including Constitutional, Eyes, Respiratory, Cardiovascular, Gastroenterology, Genitourinary, Integumentary, Musculoskeletal, Psychiatric were all negative except for pertinent positives noted in my HPI.    Physical Exam:  /76 mmHg  Pulse 100  Temp(Src) 97.7  F (36.5  C)  Resp 18  Ht 5' 6\" (1.676 m)  Wt 192 lb 12.8 oz " (87.454 kg)  BMI 31.13 kg/m2  SpO2 97%  GENERAL APPEARANCE:  Alert, in no distress, cooperative, oriented x self and place and recent events.   EYES:  EOM, lids, pupils and irises normal, sclera clear and conjunctiva normal, no discharge or mattering on lids or lashes noted  ENT:  Mouth normal, moist mucous membranes, nose normal without drainage or crusting, external ears without lesions, hearing acuity intact  RESP:  respiratory effort and palpation of chest normal, no chest wall tenderness, no respiratory distress, Lung sounds congested throughout with ronchi upper airways with crackles, patient is on oxygen  CV:  Palpation and auscultation of heart done, rate and rhythm controlled and regular, no murmur, no rub or gallop. Edema +3 pitting bilateral lower extremities.  M/S:   Gait and station wheelchair bound, Digits and nails normal. Generalized pain lower legs. Unable to move legs with hx MS  NEURO: no facial asymmetry, no speech deficits and able to follow directions, LE weakness chronic with known MS  PSYCH:  insight and judgement at baseline, memory forgetful, affect and mood normal     Recent Labs:    WBC     12.2   1/31/2017  RBC     3.07   1/31/2017  HGB      9.5   1/31/2017  HCT     30.5   1/31/2017  MCV       99   1/31/2017  MCH       31   1/31/2017  MCHC       31   1/31/2017  RDW     19.7   1/31/2017  PLT      372   1/31/2017  PLT      425   8/10/2016      Last Basic Metabolic Panel:  Recent Labs   Lab Test 01/23/17 01/06/17 01/04/17   0714   NA  141   --   138   POTASSIUM  3.6   --   3.5   CHLORIDE  106   --   106   CAITLYN  8.7   --   7.9*   CO2  27   --   24   BUN  13   --   8   CR  0.48*  0.52*  0.46*   GLC  86   --   105*       Liver Function Studies -   Recent Labs   Lab Test 01/06/17 01/04/17   0714  12/24/16   1115   PROTTOTAL   --   5.0*  7.2   ALBUMIN   --   1.2*  1.5*   BILITOTAL   --   0.2  0.3   ALKPHOS   --   101  158*   AST  14  13  30   ALT   --   <6  34       TSH   Date Value Ref Range  Status   05/04/2016 1.67 mcU/mL Final       A1C      6.3   11/1/2016 and A1C      6.6   7/20/2016    Assessment/Plan:  Hypoxia  Acute onset, some increase in chest congestion, no fever.     Dysuria  Acute on chronic.     Other chronic pain  Improved with scheduled Oxycodone.     Acute deep vein thrombosis (DVT) of tibial vein of left lower extremity (H)  Ongoing since late 2016, on lovenox daily.     Non-small cell cancer of left lung (H)  Ongoing - f/u with radiology as recommended, completed palliative radiation    Generalized muscle weakness  Chronic issue, wheelchair and bed bound.     Orders:  1. O2 1-3 lpm per NC PRN to keep sats at or above 90% diagnosis hypoxemia  2. DuoNebs 1 neb QID diagnosis hypoxemia  3. CXR 2 views today diagnosis as above  4. CBC with diff, BMP on 2/2   5. UA/UC diagnosis dysuria  6. Swab for influenza    Total time spent with patient visit was 35 min including patient visit, review of past records and discussion of patient status and POC with staff. Greater than 50% of total time spent with counseling and coordinating care.     Electronically signed by  RE Kaufman CNP

## 2017-02-06 NOTE — PROGRESS NOTES
[facility]    Chief Complaint   Patient presents with     custodial Regulatory       HPI:    Adwoa Peralta is a 76 year old  (1940), who is being seen today for a federally mandated E/M visit at [facility].    Today's concerns are:  Leukocytosis, unspecified type  Urinary tract infection symptoms  Patient with recent leukocytosis. Has a right ureteral stent and is scheduled on  for cystoscopy with urology. Last week upper airway congestion decreased saturations. UA appeared positive. Started on Rocephin 1 gm IM daily for the past five days. WBC     12.1   2/3/2017, down from WBC     13.2   2017. UC mixed organisms. No further fevers, now on room air. Negative for influenza swab. On re-check WBC     10.2   2017,   HGB      9.6   2017, PLT      388   2017.     Chronic pain of both lower extremities  Appears to be more comfortable with low dose scheduled Oxycodone 5 mg PO every 8 hrs. She has PRN Oxycodone and has only been getting one extra dose every few days. She reports that she consciously tries to do without extra narcotic PRN doses to stay more alert.     Non-small cell cancer of left lung (H)  Completed radiation therapy and is due to f/u with CT of chest in 2017.     Type 2 diabetes mellitus with diabetic neuropathy, without long-term current use of insulin (H)  Chronic and diet controlled at this time. B-152 and A1C      6.3   2016. Due for re-check of A1C this month.     Edema, unspecified type  Had extra Lasix doses over the weekend. WEIGHT: 17 207.4 LBS AND 17 193.2 LBS and BP: /48-94. CR     0.66   2/3/2017, POTASSIUM      4.0   2/3/2017 and BUN       16   2/3/2017.       ALLERGIES: Dilaudid and Questran  PAST MEDICAL HISTORY:  has a past medical history of Neuromuscular disorder (H); Hypertension; Vitamin D deficiency; Diabetes (H); Lung nodule; and Multiple sclerosis (H).  PAST SURGICAL HISTORY:  has past  surgical history that includes GYN surgery; Decorticate lung (7/1/2014); Thoracotomy (7/1/2014); Lobectomy lung (7/1/2014); Cystoscopy, retrogrades, insert stent ureter(s), combined (Right, 12/27/2016); and Combined Cystoscopy, Insert Stent Ureter(s) (Right, 12/27/2016).  FAMILY HISTORY: family history is not on file.  SOCIAL HISTORY:  reports that she has been smoking Cigarettes.  She has a 60 pack-year smoking history. She has never used smokeless tobacco. She reports that she does not drink alcohol or use illicit drugs.    MEDICATIONS:  Current Outpatient Prescriptions   Medication Sig Dispense Refill     ipratropium - albuterol 0.5 mg/2.5 mg/3 mL (DUONEB) 0.5-2.5 (3) MG/3ML neb solution Take 1 vial by nebulization 4 times daily       oxyCODONE (ROXICODONE) 5 MG IR tablet Take 1 tablet (5 mg) by mouth every 8 hours And every 6 hrs PRN 30 tablet 0     Furosemide (LASIX PO) Take 20 mg by mouth daily       LevoFLOXacin (LEVAQUIN PO) Take 500 mg by mouth every morning       enoxaparin (LOVENOX) 40 MG/0.4ML injection Inject 0.4 mLs (40 mg) Subcutaneous every 24 hours       metoprolol (LOPRESSOR) 25 MG tablet Take 1 tablet (25 mg) by mouth 2 times daily 60 tablet      miconazole (MICATIN; MICRO GUARD) 2 % powder Apply topically every hour as needed for other (topical candidiasis)       arginine (ARGINAID) PACK Take 1 packet by mouth 2 times daily       GABAPENTIN PO Take 600 mg by mouth 2 times daily       Multiple Vitamins-Minerals (MULTIVITAL PO) Take 1 tablet by mouth every morning       Calcium Carb-Cholecalciferol 500-200 MG-UNIT TABS Take 1 tablet by mouth 2 times daily       Atorvastatin Calcium (LIPITOR PO) Take 10 mg by mouth At Bedtime        acetaminophen (TYLENOL) 325 MG tablet Take 1,000 mg by mouth 3 times daily        polyethylene glycol (MIRALAX/GLYCOLAX) powder Take 17 g by mouth 2 times daily        Dalfampridine (AMPYRA) 10 MG TB12 Take 10 mg by mouth daily        CITALOPRAM HYDROBROMIDE PO Take 20  "mg by mouth daily       Medications reviewed:  Medications reconciled to facility chart and changes were made to reflect current medications as identified as above med list. Below are the changes that were made:   Medications stopped since last EPIC medication reconciliation:   There are no discontinued medications.    Medications started since last [facility] medication reconciliation:  Orders Placed This Encounter   Medications     ipratropium - albuterol 0.5 mg/2.5 mg/3 mL (DUONEB) 0.5-2.5 (3) MG/3ML neb solution     Sig: Take 1 vial by nebulization 4 times daily       Case Management:  I have reviewed the care plan and MDS and do agree with the plan. Patient's desire to return to the community is not present.  Information reviewed:  Medications, vital signs, orders, and nursing notes.    ROS:  No chest pain, shortness of breath, fevers, chills, headache, nausea, vomiting, dysuria or bowel abnormalities.  Appetite is  fair.  Chronic LE pain - controlled with current medications, CONSTITUTIONAL:  weight loss and fatigue, RESPIRATORY: improved sats, NEURO:  weakness and MUSCULOSKELETAL: leg pain    Exam:  /74 mmHg  Pulse 77  Temp(Src) 99.1  F (37.3  C)  Resp 18  Ht 5' 6\" (1.676 m)  Wt 193 lb 3.2 oz (87.635 kg)  BMI 31.20 kg/m2  SpO2 92%  GENERAL APPEARANCE:  Alert, in no distress, pleasant, cooperative, oriented x self place and date  EYES:  EOM, lids, pupils and irises normal, sclera clear and conjunctiva normal, no discharge or mattering on lids or lashes noted  ENT:  Mouth normal, moist mucous membranes, nose normal without drainage or crusting, external ears without lesions, hearing acuity intact  NECK:  Nontender, supple, symmetrical; no adenopathy, masses or thyromegaly, trachea midline  RESP:  respiratory effort and palpation of chest normal, no chest wall tenderness, no respiratory distress, Lung sounds clear, patient is on room air  CV:  Palpation and auscultation of heart done, rate and rhythm " controlled and regular, no murmur, no rub or gallop. Edema +2 pitting bilateral lower extremities. VASCULAR: no open areas on extremities  ABDOMEN:  normal bowel sounds, soft, nontender, no grimacing or guarding with palpation, no hepatosplenomegaly or other masses  M/S:   Gait and station bedbound and wheelchair bound, Digits and nails normal. Generalized extremity weakness with known MS diagnosis.   SKIN:  Inspection and palpation of skin and subcutaneous tissue: skin on extremities warm, dry and intact without rashes  NEURO: cranial nerves 2-12 grossly intact, no facial asymmetry, no speech deficits and able to follow directions, moves all extremities symmetrically  PSYCH:  insight and judgement at baseline, memory forgetful, affect and mood normal     Lab/Diagnostic data:    CBC 2/6/17:   WBC     10.2   2/6/2017  RBC     3.06   2/6/2017  HGB      9.6   2/6/2017  HCT     31.4   2/6/2017  MCV      103   2/6/2017  MCH       31   2/6/2017  MCHC       31   2/6/2017  RDW     20.2   2/6/2017  PLT      388   2/6/2017      CBC RESULTS:   Recent Labs   Lab Test 02/03/17 02/02/17   WBC  12.1*  13.2*   RBC  3.21*  3.04*   HGB  10.0*  9.6*   HCT  32.2*  30.8*   MCV  100  101*   MCH  31  32   MCHC  31*  31*   RDW  19.7*  19.3*   PLT  387  358       Last Basic Metabolic Panel:  Recent Labs   Lab Test 02/03/17 02/02/17   NA  138  137   POTASSIUM  4.0  3.6   CHLORIDE  102  100   CAITLYN  8.9  8.5   CO2  26  26   BUN  16  16   CR  0.66  0.75   GLC  121*  136*       Liver Function Studies -   Recent Labs   Lab Test 01/06/17 01/04/17   0714  12/24/16   1115   PROTTOTAL   --   5.0*  7.2   ALBUMIN   --   1.2*  1.5*   BILITOTAL   --   0.2  0.3   ALKPHOS   --   101  158*   AST  14  13  30   ALT   --   <6  34       TSH   Date Value Ref Range Status   05/04/2016 1.67 mcU/mL Final     A1C      6.3   11/1/2016  A1C      6.6   7/20/2016      ASSESSMENT/PLAN  Leukocytosis, unspecified type  Resolved after five days of rocephin. Monitor.      Urinary tract infection symptoms  Resolved after five days of rocephin. Monitor.     Chronic pain of both lower extremities  Chronic - doing well with scheduled and PRN pain meds. No changes today.     Non-small cell cancer of left lung (H)  F/U scheduled with radiation oncology in April of 2017.     Type 2 diabetes mellitus with diabetic neuropathy, without long-term current use of insulin (H)  Chronic. A1C      5.0   2/6/2017 diet controlled. Monitor.     Edema, unspecified type  Improved with extra Lasix over the weekend. Monitor.     Orders:  1. PT eval and treat for bed positioning of feet and use of offloading pillows.    Total time spent with patient visit was 35 min including patient visit and review of past records.Greater than 50% of total time spent with counseling and coordinating care.    Electronically signed by:  RE Kaufman CNP

## 2017-02-08 NOTE — PROCEDURES
Radiotherapy Treatment Summary          Date of Report: 2017     PATIENT: CORAL COOPER  MEDICAL RECORD NO: [id number]  : 1940     DIAGNOSIS: C34.32 Malignant neoplasm of lower lobe, left bronchus or lung  INTENT OF RADIOTHERAPY: Cure  PATHOLOGY:  Non small cell lung cancer                                 STAGE: clinical T1b N0 M0                          Details of the treatments summarized below are found in records kept in the Department of Radiation Oncology at [facility].     Treatment Summary:  Radiation Oncology - Course: 1 Protocol: SBRT LLL  Treatment Site Dose Modality From To Days Fx.  1 Left Lower Lobe  5,000 cGy 06 X 12/15/2016  2017  28  5         Dose per Fraction:  1000 cGy/fx      Total Dose:   5,000 cGy           COMMENTS:                      Ms. Cooper is a 76 year old woman with recent clinical diagnosis of NSCLC, stage [id number]. We recommended   definitive radiotherapy given her medical comorbidities and prior lobectomy, which was delivered as detailed above. She   was admitted for altered mentation, felt likely related to urosepsis. She also had a right lower lobe infiltrate and   hypoxemia and was found to have a hematoma of the left vastus lateralis muscle. With antibiotics, her mentation and   overall clinical status improved.     Otherwise, she tolerated her course of radiotherapy well and had no CTCAE v4.0 toxicities at the completion of treatment.   She had no pain related to therapy.     PAIN MANAGEMENT:  No uncontrolled pain at the completion of SBRT.                            FOLLOW UP PLAN: RTC with CT chest in 3 months.                           Resident Physician: Pete Mazariegos M.D.   Staff Physician: MARLENY Dubose M.D.  Physicist: Margaret Alonso, PhD     CC: Jackelyn Dixon MD                                    Radiation Oncology:  [facility] [address] [place] [address]

## 2017-02-10 NOTE — PROGRESS NOTES
[facility]    Chief Complaint   Patient presents with     RECHECK       HPI:    Adwoa Peralta is a 76 year old  (1940), who is being seen today for [facility]. Today's concern is:  Deep tissue injury  Patient with new deep tissue injury to both heels and the back of both lower legs. All areas at this point are dry and unable to stage depth. Patient is in bed most of the time due to MS, recent bilateral femur fractures, debility. It appears that injuries were first noted on 17 but not reported to this NP until today. Patient does report pain associated with injury.     Chronic pain of both lower extremities  Patient continues to report LE pain, she is trying to avoid using PRN oxycodone and is on scheduled oxycodone three times daily. She is on a statin which could be contributing to pain and will attempt to stop it at this time. No s/s sedation. Currently takes oxycodone 5 mg PO every 8 hrs scheduled and gets 0-2 doses of PRN oxycodone daily. Rates pain /10 right now.     Edema due to malnutrition, due to unspecified malnutrition type (H)  Chronic issue. Has had some decrease since adding Lasix 20 mg PO daily. Now also has deep tissue injuries each heel and lower leg. BUN       16   2/3/2017, CR     0.66   2/3/2017 and POTASSIUM      4.0   2/3/2017. WEIGHT: 17 191.8 LBS AND 17 189.2 LBS and BP: 106-144/  B-161 (NO CONSISTENCY FOR TIME RANGE X 2 WEEKS)    Non-small cell cancer of left lung (H)  Patient had completed course of palliative radiation and is due for radiation oncology f/u with CT of chest in 2017. She remains DNR/DNI at this time per 2017 POLST.     Hyperlipidemia, unspecified hyperlipidemia type  On most recent check CHOL      143   2016, TRIG      116   2016, LDL       47   2016.       ALLERGIES: Dilaudid and Questran  Past Medical, Surgical, Family and Social History reviewed  and updated in [facility].    Current Outpatient Prescriptions   Medication Sig Dispense Refill     oxyCODONE (ROXICODONE) 5 MG IR tablet Take 1 tablet (5 mg) by mouth every 6 hours And every 6 hrs PRN 30 tablet 0     Nutritional Supplements (IMELDA) PACK Take 1 packet by mouth 2 times daily 60 packet 1     ipratropium - albuterol 0.5 mg/2.5 mg/3 mL (DUONEB) 0.5-2.5 (3) MG/3ML neb solution Take 1 vial by nebulization 4 times daily       Furosemide (LASIX PO) Take 30 mg by mouth daily       enoxaparin (LOVENOX) 40 MG/0.4ML injection Inject 0.4 mLs (40 mg) Subcutaneous every 24 hours       metoprolol (LOPRESSOR) 25 MG tablet Take 1 tablet (25 mg) by mouth 2 times daily 60 tablet      miconazole (MICATIN; MICRO GUARD) 2 % powder Apply topically every hour as needed for other (topical candidiasis)       GABAPENTIN PO Take 600 mg by mouth 2 times daily       Multiple Vitamins-Minerals (MULTIVITAL PO) Take 1 tablet by mouth every morning       Calcium Carb-Cholecalciferol 500-200 MG-UNIT TABS Take 1 tablet by mouth 2 times daily       acetaminophen (TYLENOL) 325 MG tablet Take 1,000 mg by mouth 3 times daily        polyethylene glycol (MIRALAX/GLYCOLAX) powder Take 17 g by mouth 2 times daily        Dalfampridine (AMPYRA) 10 MG TB12 Take 10 mg by mouth daily        CITALOPRAM HYDROBROMIDE PO Take 20 mg by mouth daily       Medications reviewed:  Medications reconciled to facility chart and changes were made to reflect current medications as identified as above med list. Below are the changes that were made:   Medications stopped since last EPIC medication reconciliation:   Medications Discontinued During This Encounter   Medication Reason     arginine (ARGINAID) PACK Medication Reconciliation Clean Up     LevoFLOXacin (LEVAQUIN PO) Medication Reconciliation Clean Up       Medications started since last [facility] medication reconciliation:  No orders of the defined types were placed in this encounter.     REVIEW OF SYSTEMS:  10 point  "ROS of systems including Constitutional, Eyes, Respiratory, Cardiovascular, Gastroenterology, Genitourinary, Integumentary, Musculoskeletal, Psychiatric were all negative except for pertinent positives noted in my HPI.    Physical Exam:  /72 mmHg  Pulse 76  Temp(Src) 99.1  F (37.3  C)  Resp 18  Ht 5' 6\" (1.676 m)  Wt 189 lb 3.2 oz (85.821 kg)  BMI 30.55 kg/m2  SpO2 95%  GENERAL APPEARANCE:  Alert, in no distress, pleasant, cooperative, oriented x self and place  EYES:  EOM, lids, pupils and irises normal, sclera clear and conjunctiva normal, no discharge or mattering on lids or lashes noted  ENT:  Mouth normal, moist mucous membranes, nose normal without drainage or crusting, external ears without lesions, hearing acuity intact  RESP:  respiratory effort and palpation of chest normal, no chest wall tenderness, no respiratory distress, Lung sounds clear, patient is on oxygen per NC for comfort.   CV:  Palpation and auscultation of heart done, rate and rhythm controlled and regular, no murmur, no rub or gallop. Edema +1 pitting bilateral lower extremities. VASCULAR: deep tissue injury to each heal and also to each posterior lower leg, no open areas at this time. Surrounding tissue reddened.   ABDOMEN:  normal bowel sounds, soft, nontender, no grimacing or guarding with palpation, no hepatosplenomegaly or other masses  M/S:   Gait and station bedbound and wheelchair bound, Digits and nails normal, generalized LE pain with any movement  NEURO: cranial nerves 2-12 grossly intact, no facial asymmetry, no speech deficits and able to follow directions, moves all extremities symmetrically  PSYCH:  insight and judgement at baseline, memory forgetful, affect and mood normal     Recent Labs:      CBC RESULTS:   Recent Labs   Lab Test 02/06/17 02/03/17   WBC  10.2  12.1*   RBC  3.06*  3.21*   HGB  9.6*  10.0*   HCT  31.4*  32.2*   MCV  103*  100   MCH  31  31   MCHC  31*  31*   RDW  20.2*  19.7*   PLT  388  387 "       Last Basic Metabolic Panel:  Recent Labs   Lab Test 02/03/17 02/02/17   NA  138  137   POTASSIUM  4.0  3.6   CHLORIDE  102  100   CAITLYN  8.9  8.5   CO2  26  26   BUN  16  16   CR  0.66  0.75   GLC  121*  136*       Liver Function Studies -   Recent Labs   Lab Test 01/06/17 01/04/17   0714  12/24/16   1115   PROTTOTAL   --   5.0*  7.2   ALBUMIN   --   1.2*  1.5*   BILITOTAL   --   0.2  0.3   ALKPHOS   --   101  158*   AST  14  13  30   ALT   --   <6  34       TSH   Date Value Ref Range Status   05/04/2016 1.67 mcU/mL Final       A1C      5.0   2/6/2017 and A1C      6.3   11/1/2016    Assessment/Plan:  Deep tissue injury  Newly noted by staff on 2/5. No current orders found in chart for treatment.     Chronic pain of both lower extremities  Chronic, not well controlled. Will increase scheduled Oxycodone frequency.   - oxyCODONE (ROXICODONE) 5 MG IR tablet; Take 1 tablet (5 mg) by mouth every 6 hours And every 6 hrs PRN    Edema due to malnutrition, due to unspecified malnutrition type (H)  Chronic, somewhat improved with addition of Lasix.     Non-small cell cancer of left lung (H)  Chronic issue, DNR/DNI and due for f/u with radiation oncology April 2017    Hyperlipidemia, unspecified hyperlipidemia type  Last lipid panel normal range. Will stop statin due to chronic pain, f/u with next routine lipid panel results.     Orders:  1. Deep tissue injury to bilateral lower legs and heels: cleanse, cover with foam dressing and change every three days and PRN. Patient is to be seen by the in house wound team ASAP for management. Bilateral pressure relief boots knee high to be on at all times. Turn and reposition patient every 2 hrs. Float legs/heels in bed. Update NP to skin on 2/14  2. Vin 1 pack PO BID diagnosis protein malnutrition, wounds.   3. Increase Lasix to 30 mg PO daily. Check Wt daily. Update NP to wt on 2/14.   4. Check BMP, CBC on 2/14 diagnosis wounds  5. DC Lipitor. F/U with next routine lipid panel  check.   6. Change Oxycodone to 5 mg PO every 6 hrs and every 6 hrs PRN. Staff to update provider if not effective.     Total time spent with patient visit was 35 min including patient visit, review of past records and discussion of patient status and POC with staff. Greater than 50% of total time spent with counseling and coordinating care.     Electronically signed by  RE Kaufman CNP

## 2017-02-13 NOTE — PROGRESS NOTES
[facility]  [facility] [address]    PRE-OP EVALUATION:    Today's date: 2017    Adwoa Peralta (: 1940) presents for pre-operative evaluation assessment as requested by Dr. Hawthorne.  Pt requires evaluation and anesthesia risk assessment prior to undergoing surgery/procedure for treatment of ureteral stones, s/p right stent placement.  Proposed procedure: COMBINED CYSTOSCOPY, URETEROSCOPY, LASER HOLMIUM LITHOTRIPSY URETER(S), INSERT STENT.    Patient seen today at [facility] location.    Date of Surgery/ Procedure: 17  Time of Surgery/ Procedure: 08:45  Hospital/Surgical Facility: [facility]  Primary Physician: Dr Natalya Manjarrez, Jackelyn Dixon, NP  Type of Anesthesia Anticipated: to be determined  History of anesthesia complications: NONE  History of  abnormal bleeding: NONE   History of blood transfusions: YES.  No complications.  Patient has a Health Care Directive or Living Will:  YES POLST signed 17 shows DNR/DNI.     PREOP QUESTIONNAIRE  ====================  1- NO - Do you ever have any pain or discomfort in your chest?  2- NO - Have you ever had a severe pain across the front of your chest lasting for half an hour or more?  3- YES - Do you have swelling in your feet or ankles at times? Patient bed and wheelchair bound and severe weakness both lower legs due to MS. On Lasix daily to control LE edema.   4- NO - Are you troubled by shortness of breath when: walking on the level/ up a slight hill/ at night? Patient does not ambulate, bed and wheelchair bound.   5- NO - Does your chest ever sound wheezy or whistling? Not at this time  6- NO - Do you currently have a cold, bronchitis or other respiratory infection?  7- NO - Have you had a cold, bronchitis or other respiratory infection within the last 2 weeks?  8- YES - Do you usually have a cough? Patient with known lung nodule and lung cancer diagnosis, s/p palliative radiation course.   9- NO -  Do you sometimes get pains in the calves of your legs when you walk?  10-YES - Do you or anyone in your family have previous history of blood clots? Patient with recent LE DVT, did not tolerate Coumadin - currently managed with daily Lovenox injections.   11-NO - Do you or does anyone in your family have serious bleeding problem such as prolonged bleeding following surgeries or cuts?  12-YES - Have you ever had problems with anemia or been told to take iron pills? Last Hgb as noted below. Anemia of chronic disease.   Lab Results   Component Value Date    HGB 9.6 02/06/2017    HGB 10.0 02/03/2017     13-NO - Have you had any abnormal blood loss such as black, tarry or bloody stools, or abnormal vaginal bleeding?  14-NO - Have you or any of your relatives ever had problems with anesthesia?  15-NO - Do you snore or stop breathing at night?  16-NO - Do you have any prosthetic heart valves or joints?  17-NO - Is there any chance that you may be pregnant?    HPI:   Kidney stone  During recent hospitalization December 2016 CT abdomen with ureteral stones and she had a right ureteral stent placed 12/27/16. Now scheduled for COMBINED CYSTOSCOPY, URETEROSCOPY, LASER HOLMIUM LITHOTRIPSY URETER(S), INSERT STENT with Dr Hawthorne. Recently treated for suspected UTI - symptoms of fever, leukocytosis resolved after five day course of Rocephin and final UC was negative.     Acute deep vein thrombosis (DVT) of tibial vein of left lower extremity (H)  Managed with Lovenox 40 mg IM daily as did not previously tolerate Coumadin due to hematoma.     Non-small cell cancer of left lung (H)  Patient had completed course of palliative radiation and is due for radiation oncology f/u with CT of chest in April of 2017. She remains DNR/DNI at this time per January 2017 POLST.     Benign essential hypertension  WEIGHT: 1/31/17 192.8 LBS AND 2/11/17 191.4 LBS and BP: /50-86. Managed with Lasix, Metoprolol.   Lab Results   Component Value Date     CR 0.66 2017     Type 2 diabetes mellitus with diabetic neuropathy, without long-term current use of insulin (H)  B-161 (NO TIME RANGE AVAILABLE) and diet controlled.   Lab Results   Component Value Date    A1C 5.0 2017       MS (multiple sclerosis) (H)  Deep tissue injury  Patient wheelchair and bed bound. Now has new deep tissue injuries to each heel and each calf. Managed with foam dressing, pressure relief boots.     Edema, unspecified type  Improved with increase in Lasix dose. TTE with EF 35-40% thought secondary to stress cardiomyopathy in setting of sepsis and she was started on metoprolol.    Patient Active Problem List    Diagnosis Date Noted     Acute deep vein thrombosis (DVT) of tibial vein of left lower extremity (H) 2016     Priority: Medium     Encounter for therapeutic drug monitoring 2016     Priority: Medium     Long term (current) use of anticoagulants 2016     Priority: Medium     Chronic pain of both lower extremities 2016     Priority: Medium     Non-small cell cancer of left lung (H) 2016     Priority: Medium     Generalized muscle weakness 2016     Priority: Medium     Benign essential hypertension 10/18/2016     Priority: Medium     Type 2 diabetes mellitus with diabetic neuropathy (H) A1C 6.6 on 2016     Priority: Medium     Bilateral Femur fracture (H) 2016     Priority: Medium     Hyperglycemia 2016     Priority: Medium     Skin breakdown 2016     Priority: Medium     Maceration of skin 2016     Priority: Medium     Candidiasis of skin 2016     Priority: Medium     Weight gain 2016     Priority: Medium     Visit for annual health examination 2016 10/13/2015     Priority: Medium     Atrophic vaginitis 2015     Priority: Medium     Edema 2015     Priority: Medium     Hyperlipidemia 2015     Priority: Medium     Diagnosis updated by automated process. Provider to  review and confirm.       Neuropathy (H) 01/26/2015     Priority: Medium     Constipation 01/26/2015     Priority: Medium     ACP (advance care planning) 12/04/2014     Priority: Medium     Advance Care Planning 11/15/2016: Receipt of ACP document:  Received: POLST which was signed and dated by provider on 9/9/16.  Document previously scanned on 9/9/16.  Order reviewed and found to be valid.  Code Status reflects choices in most recent ACP document.  Confirmed/documented designated decision maker(s).  Added by Leela Rogers RN, Advance Care Planning Liaison.  Advance Care Planning 12/4/2014: Receipt of ACP document:  Received: POLST which was signed and dated by provider on 12-1-14.  Document previously scanned on 12-1-14.  Order reviewed and found to be valid.  Code Status reflects choices in most recent ACP document.  Confirmed/documented designated decision maker(s). See permanent comments section of demographics in clinical tab. View document(s) and details by clicking on code status. Added by Kim Quiñones RN, System ACP Coordinator [employer].         Dementia 11/24/2014     Priority: Medium     Esophageal reflux 10/31/2014     Priority: Medium     Disturbance of skin sensation  10/08/2014     Priority: Medium     Abscess of right lung with pneumonia, S/P R thoracotomy, total decortication and R LL lobectomy 7-2-14 07/16/2014     Priority: Medium     MS (multiple sclerosis) (H) 07/10/2014     Priority: Medium       Past Medical History   Diagnosis Date     Diabetes (H)      new dx 8/2016     Hypertension      Lung nodule      Multiple sclerosis (H)      Neuromuscular disorder (H)      Vitamin D deficiency        Past Surgical History   Procedure Laterality Date     Gyn surgery       Decorticate lung  7/1/2014     Procedure: DECORTICATE LUNG;  Surgeon: Eyal Echols MD;  Location: [facility] OR     Thoracotomy  7/1/2014     Procedure: THORACOTOMY;  Surgeon: Eyal Echols MD;   Location: [facility] OR     Lobectomy lung  7/1/2014     Procedure: LOBECTOMY LUNG;  Surgeon: Eyal Echols MD;  Location: [facility] OR     Cystoscopy, retrogrades, insert stent ureter(s), combined Right 12/27/2016     Procedure: COMBINED CYSTOSCOPY, RETROGRADES, INSERT STENT URETER(S);  Surgeon: Quinn Hawthorne MD;  Location: [facility] OR     Combined cystoscopy, insert stent ureter(s) Right 12/27/2016     Procedure: COMBINED CYSTOSCOPY, INSERT STENT URETER(S);  Surgeon: Quinn Hawthorne MD;  Location: [facility] OR       No family history on file.    Social History   Substance Use Topics     Smoking status: Current Every Day Smoker     Packs/day: 1.00     Years: 60.00     Types: Cigarettes     Smokeless tobacco: Never Used      Comment: currently only smoking 4 cigs a day     Alcohol use No          Allergies   Allergen Reactions     Dilaudid [Hydromorphone] Hives     Per patient      Questran [Cholestyramine] Rash     Itchy rash     Current Outpatient Prescriptions   Medication Sig Dispense Refill     oxyCODONE (ROXICODONE) 5 MG IR tablet Take 1 tablet (5 mg) by mouth every 6 hours And every 6 hrs PRN 30 tablet 0     Nutritional Supplements (IMELDA) PACK Take 1 packet by mouth 2 times daily 60 packet 1     ipratropium - albuterol 0.5 mg/2.5 mg/3 mL (DUONEB) 0.5-2.5 (3) MG/3ML neb solution Take 1 vial by nebulization 4 times daily       Furosemide (LASIX PO) Take 30 mg by mouth daily       enoxaparin (LOVENOX) 40 MG/0.4ML injection Inject 0.4 mLs (40 mg) Subcutaneous every 24 hours       metoprolol (LOPRESSOR) 25 MG tablet Take 1 tablet (25 mg) by mouth 2 times daily 60 tablet      miconazole (MICATIN; MICRO GUARD) 2 % powder Apply topically every hour as needed for other (topical candidiasis)       GABAPENTIN PO Take 600 mg by mouth 2 times daily       Multiple Vitamins-Minerals (MULTIVITAL PO) Take 1 tablet by mouth every morning       Calcium Carb-Cholecalciferol 500-200 MG-UNIT TABS Take 1 tablet by mouth 2 times  "daily       acetaminophen (TYLENOL) 325 MG tablet Take 1,000 mg by mouth 3 times daily        polyethylene glycol (MIRALAX/GLYCOLAX) powder Take 17 g by mouth 2 times daily        Dalfampridine (AMPYRA) 10 MG TB12 Take 10 mg by mouth daily        CITALOPRAM HYDROBROMIDE PO Take 20 mg by mouth daily         REVIEW OF SYSTEMS:  No chest pain, fevers, chills, headache, nausea, vomiting or bowel abnormalities.  Appetite is  fair.  Generalized chronic moderate to severe pain, CONSTITUTIONAL:  fatigue and forgetfulness, CV:  lower extremity edema and known recent DVT, RESPIRATORY: cough and sats 94% RA but using O2 for comfort 92-96% with oxygen, known lung cancer DX, :  Ureter stent in place, recent UTI, NEURO:  weakness, gait disturbance and diagnosis MS, MUSCULOSKELETAL: leg pain and weakness with MS and hx bilateral femur fractures 2016, HEME/LYMPH: anemia of chronic disease, ENDO: diet controlled diabetic and SKIN: deep tissue injuries to each heel and each calf.    EXAM:  /61  Pulse 73  Temp 98.1  F (36.7  C)  Resp 16  Ht 5' 6\" (1.676 m)  Wt 191 lb 6.4 oz (86.8 kg)  SpO2 93%  BMI 30.89 kg/m2  GENERAL APPEARANCE:  Alert, pleasant, cooperative, oriented x self and place  EYES:  EOM, lids, pupils and irises normal, sclera clear and conjunctiva normal, no discharge or mattering on lids or lashes noted  ENT:  Mouth normal, moist mucous membranes, nose normal without drainage or crusting, external ears without lesions, hearing acuity intact  RESP:  respiratory effort and palpation of chest normal, no chest wall tenderness, no respiratory distress, Lung sounds clear upper airways and diminished in bases, patient is on oxygen per NC for comfort.   CV:  Palpation and auscultation of heart done, rate and rhythm controlled and regular, no murmur, no rub or gallop. Edema +1 pitting bilateral lower extremities. VASCULAR: deep tissue injury to each heal and also to each posterior lower leg, right posterior calf with some " dark drainage on foam dressing. Surrounding tissue reddened. Heel DTI areas are dry bilaterally.   ABDOMEN:  normal bowel sounds, soft, nontender, no grimacing or guarding with palpation, no hepatosplenomegaly or other masses  M/S:   Gait and station bedbound and wheelchair bound, Digits and nails normal, generalized LE pain with any passive movement - unable to move legs interdependently.   NEURO: cranial nerves 2-12 grossly intact, no facial asymmetry, no speech deficits and able to follow directions, moves all extremities symmetrically  PSYCH:  insight and judgement impaired, memory forgetful, affect and mood normal     DIAGNOSTICS:   Preop Testing   EKG: Not indicated due to non-vascular surgery and last EKG on 12/24/17 sinus tach, ST & T abnormalities - reviewed with PCP Dr Manjarrez and in view of no CV symptoms and non-vascular procedure OK to proceed without further cardiology work up.   Hemoglobin re-check pending today 2/14/17  Lab Results   Component Value Date    HGB 9.6 02/06/2017     Serum Potassium pending today 2/14/17  Lab Results   Component Value Date    POTASSIUM 4.0 02/03/2017     Serum Creatinine pending today 2/14/17  Lab Results   Component Value Date    CR 0.66 02/03/2017     Hemoglobin A1C:   Lab Results   Component Value Date    A1C 5.0 02/06/2017     Coagulation Studies (e.g. INR, PTT, platelet count) on Lovenox daily, will check PTT on 2/16  Lab Results   Component Value Date     02/06/2017     08/10/2016       Chest XRay: not needed, no new respiratory symptoms. Oxygen per NC PRN to keep sats above 89% and for comfort. Uses periodically throughout the day.     Labs Drawn and in Process at this time.     IMPRESSION:  Reason for surgery/procedure: kidney stones, ureter stent in place.     The proposed surgical procedure is considered INTERMEDIATE risk.    For above listed surgery and anesthesia:   Patient is at MODERATE risk for surgery/procedure and perioperative/procedure  complications.    RECOMMENDATIONS:    --Approval given to proceed with proposed procedure, without further diagnostic evaluation other than mentioned above.   --Patient is currently taking    Anticoagulant or Antiplatelet Medication Use  Currently on daily Lovenox - hold Lovenox on day of surgery 2/21, resume after surgery.       --Patient has   Pulmonary Risk  Incentive spirometry post op  Advised smoking cessation.     --Patient has  Anemia and does not require treatment prior to surgery.  Monitor Hemoglobin postoperatively.  --Because of DVT or PE history, patient should resume Lovenox as previously ordered after surgery. AM dose of Lovenox will be held morning of procedure.   --Patient is to take all other scheduled medications on the day of surgery       Signed Electronically by: RE Kaufman CNP     ADDENDUM    At 1200 reviewed labs from 2/14/17:  Na 142, K 3.8, Cr 0.7, BUN 25, WBC 7.8, HGB 10.3, .   OK to proceed with surgery as noted above. Will check PTT on 2/16/17    Electronically signed by RE Kaufman, GNP

## 2017-02-14 NOTE — MR AVS SNAPSHOT
After Visit Summary   2/14/2017    Adwoa Pearlta    MRN: [id number]           Patient Information     Date Of Birth          1940        Visit Information        Provider Department      2/14/2017 12:00 PM Jackelyn Dixon APRN CNP [facility]        Today's Diagnoses     Kidney stone    -  1    Acute deep vein thrombosis (DVT) of tibial vein of left lower extremity (H)        Non-small cell cancer of left lung (H)        Benign essential hypertension        Type 2 diabetes mellitus with diabetic neuropathy, without long-term current use of insulin (H)        MS (multiple sclerosis) (H)        Deep tissue injury        Edema, unspecified type           Follow-ups after your visit        Your next 10 appointments already scheduled     Feb 14, 2017 12:00 PM CST   Nursing Home with Jackelyn S Dixon, APRN CNP   [facility] ([facility])    [address]  [facility] [address]   [phone]            Feb 21, 2017   Procedure with Quinn Hawthorne MD   [facility] (--)    [address] [place] [address] [address]  [facility] [address]   [phone]            Mar 31, 2017 10:40 AM CDT   CT CHEST W/O CONTRAST with UUCT1   [facility], [place], [place] ([facility], [facility])    [address] [facility] [id number] [phone]           Please bring any scans or X-rays taken at other hospitals, if similar tests were done. Also bring a list of your medicines, including vitamins, minerals and over-the-counter drugs. It is safest to leave personal items at home.  Be sure to tell your doctor:   If you have any allergies.   If there s any chance you are pregnant.   If you are breastfeeding.   If you have any special needs.  You do not need to do anything special to prepare.  Please wear loose clothing, such as a sweat suit or jogging clothes. Avoid snaps, zippers and other metal. We may ask  "you to undress and put on a hospital gown.            2017 10:00 AM CDT   Return Visit with Iggy Dubose MD   Radiation Oncology Clinic ([facility])    [facility]  1st Floor  [address] [facility] [address] [phone]              Who to contact     If you have questions or need follow up information about today's clinic visit or your schedule please contact [facility] directly at [phone].  Normal or non-critical lab and imaging results will be communicated to you by MyChart, letter or phone within 4 business days after the clinic has received the results. If you do not hear from us within 7 days, please contact the clinic through [email or web address] or phone. If you have a critical or abnormal lab result, we will notify you by phone as soon as possible.  Submit refill requests through [employer] or call your pharmacy and they will forward the refill request to us. Please allow 3 business days for your refill to be completed.          Additional Information About Your Visit        [email or web address] Information     [employer] lets you send messages to your doctor, view your test results, renew your prescriptions, schedule appointments and more. To sign up, go to [email or web address] . Click on \"Log in\" on the left side of the screen, which will take you to the Welcome page. Then click on \"Sign up Now\" on the right side of the page.     You will be asked to enter the access code listed below, as well as some personal information. Please follow the directions to create your username and password.     Your access code is: [email or web address]  Expires: 4/3/2017  8:42 AM     Your access code will  in 90 days. If you need help or a new code, please call your [facility] clinic or [phone].        Care EveryWhere ID     This is your Care EveryWhere ID. This could be used by other organizations to access your [facility] medical records  [id number]        Your " "Vitals Were     Pulse Temperature Respirations Height Pulse Oximetry BMI (Body Mass Index)    73 98.1  F (36.7  C) 16 5' 6\" (1.676 m) 93% 30.89 kg/m2       Blood Pressure from Last 3 Encounters:   02/13/17 113/61   02/10/17 144/72   02/06/17 132/74    Weight from Last 3 Encounters:   02/13/17 191 lb 6.4 oz (86.8 kg)   02/10/17 189 lb 3.2 oz (85.8 kg)   02/06/17 193 lb 3.2 oz (87.6 kg)              Today, you had the following     No orders found for display       Primary Care Provider    None Specified       No primary provider on file.        Thank you!     Thank you for choosing [facility]  for your care. Our goal is always to provide you with excellent care. Hearing back from our patients is one way we can continue to improve our services. Please take a few minutes to complete the written survey that you may receive in the mail after your visit with us. Thank you!             Your Updated Medication List - Protect others around you: Learn how to safely use, store and throw away your medicines at www.disposemymeds.org.          This list is accurate as of: 2/14/17 10:57 AM.  Always use your most recent med list.                   Brand Name Dispense Instructions for use    acetaminophen 325 MG tablet    TYLENOL     Take 1,000 mg by mouth 3 times daily       AMPYRA 10 MG Tb12   Generic drug:  Dalfampridine      Take 10 mg by mouth daily       Calcium Carb-Cholecalciferol 500-200 MG-UNIT Tabs      Take 1 tablet by mouth 2 times daily       CITALOPRAM HYDROBROMIDE PO      Take 20 mg by mouth daily       enoxaparin 40 MG/0.4ML injection    LOVENOX     Inject 0.4 mLs (40 mg) Subcutaneous every 24 hours       GABAPENTIN PO      Take 600 mg by mouth 2 times daily       ipratropium - albuterol 0.5 mg/2.5 mg/3 mL 0.5-2.5 (3) MG/3ML neb solution    DUONEB     Take 1 vial by nebulization 4 times daily       IMELDA Pack     60 packet    Take 1 packet by mouth 2 times daily       LASIX PO      Take 30 mg by " mouth daily       metoprolol 25 MG tablet    LOPRESSOR    60 tablet    Take 1 tablet (25 mg) by mouth 2 times daily       miconazole 2 % powder    MICATIN; MICRO GUARD     Apply topically every hour as needed for other (topical candidiasis)       MULTIVITAL PO      Take 1 tablet by mouth every morning       oxyCODONE 5 MG IR tablet    ROXICODONE    30 tablet    Take 1 tablet (5 mg) by mouth every 6 hours And every 6 hrs PRN       polyethylene glycol powder    MIRALAX/GLYCOLAX     Take 17 g by mouth 2 times daily

## 2017-02-20 NOTE — H&P (VIEW-ONLY)
[place] [facility]  [facility] [id number]    PRE-OP EVALUATION:    Today's date: 2017    Adwoa Peralta (: 1940) presents for pre-operative evaluation assessment as requested by Dr. Hawthorne.  Pt requires evaluation and anesthesia risk assessment prior to undergoing surgery/procedure for treatment of ureteral stones, s/p right stent placement.  Proposed procedure: COMBINED CYSTOSCOPY, URETEROSCOPY, LASER HOLMIUM LITHOTRIPSY URETER(S), INSERT STENT.    Patient seen today at [facility] location.    Date of Surgery/ Procedure: 17  Time of Surgery/ Procedure: 08:45  Hospital/Surgical Facility: [facility]  Primary Physician: Dr Natalya Manjarrez, Jackelyn Dixon, NP  Type of Anesthesia Anticipated: to be determined  History of anesthesia complications: NONE  History of  abnormal bleeding: NONE   History of blood transfusions: YES.  No complications.  Patient has a Health Care Directive or Living Will:  YES POLST signed 17 shows DNR/DNI.     PREOP QUESTIONNAIRE  ====================  1- NO - Do you ever have any pain or discomfort in your chest?  2- NO - Have you ever had a severe pain across the front of your chest lasting for half an hour or more?  3- YES - Do you have swelling in your feet or ankles at times? Patient bed and wheelchair bound and severe weakness both lower legs due to MS. On Lasix daily to control LE edema.   4- NO - Are you troubled by shortness of breath when: walking on the level/ up a slight hill/ at night? Patient does not ambulate, bed and wheelchair bound.   5- NO - Does your chest ever sound wheezy or whistling? Not at this time  6- NO - Do you currently have a cold, bronchitis or other respiratory infection?  7- NO - Have you had a cold, bronchitis or other respiratory infection within the last 2 weeks?  8- YES - Do you usually have a cough? Patient with known lung nodule and lung cancer diagnosis, s/p palliative radiation course.   9- NO -  Do you sometimes get pains in the calves of your legs when you walk?  10-YES - Do you or anyone in your family have previous history of blood clots? Patient with recent LE DVT, did not tolerate Coumadin - currently managed with daily Lovenox injections.   11-NO - Do you or does anyone in your family have serious bleeding problem such as prolonged bleeding following surgeries or cuts?  12-YES - Have you ever had problems with anemia or been told to take iron pills? Last Hgb as noted below. Anemia of chronic disease.   Lab Results   Component Value Date    HGB 9.6 02/06/2017    HGB 10.0 02/03/2017     13-NO - Have you had any abnormal blood loss such as black, tarry or bloody stools, or abnormal vaginal bleeding?  14-NO - Have you or any of your relatives ever had problems with anesthesia?  15-NO - Do you snore or stop breathing at night?  16-NO - Do you have any prosthetic heart valves or joints?  17-NO - Is there any chance that you may be pregnant?    HPI:   Kidney stone  During recent hospitalization December 2016 CT abdomen with ureteral stones and she had a right ureteral stent placed 12/27/16. Now scheduled for COMBINED CYSTOSCOPY, URETEROSCOPY, LASER HOLMIUM LITHOTRIPSY URETER(S), INSERT STENT with Dr Hawthorne. Recently treated for suspected UTI - symptoms of fever, leukocytosis resolved after five day course of Rocephin and final UC was negative.     Acute deep vein thrombosis (DVT) of tibial vein of left lower extremity (H)  Managed with Lovenox 40 mg IM daily as did not previously tolerate Coumadin due to hematoma.     Non-small cell cancer of left lung (H)  Patient had completed course of palliative radiation and is due for radiation oncology f/u with CT of chest in April of 2017. She remains DNR/DNI at this time per January 2017 POLST.     Benign essential hypertension  WEIGHT: 1/31/17 192.8 LBS AND 2/11/17 191.4 LBS and BP: /50-86. Managed with Lasix, Metoprolol.   Lab Results   Component Value Date     CR 0.66 2017     Type 2 diabetes mellitus with diabetic neuropathy, without long-term current use of insulin (H)  B-161 (NO TIME RANGE AVAILABLE) and diet controlled.   Lab Results   Component Value Date    A1C 5.0 2017       MS (multiple sclerosis) (H)  Deep tissue injury  Patient wheelchair and bed bound. Now has new deep tissue injuries to each heel and each calf. Managed with foam dressing, pressure relief boots.     Edema, unspecified type  Improved with increase in Lasix dose. TTE with EF 35-40% thought secondary to stress cardiomyopathy in setting of sepsis and she was started on metoprolol.    Patient Active Problem List    Diagnosis Date Noted     Acute deep vein thrombosis (DVT) of tibial vein of left lower extremity (H) 2016     Priority: Medium     Encounter for therapeutic drug monitoring 2016     Priority: Medium     Long term (current) use of anticoagulants 2016     Priority: Medium     Chronic pain of both lower extremities 2016     Priority: Medium     Non-small cell cancer of left lung (H) 2016     Priority: Medium     Generalized muscle weakness 2016     Priority: Medium     Benign essential hypertension 10/18/2016     Priority: Medium     Type 2 diabetes mellitus with diabetic neuropathy (H) A1C 6.6 on 2016     Priority: Medium     Bilateral Femur fracture (H) 2016     Priority: Medium     Hyperglycemia 2016     Priority: Medium     Skin breakdown 2016     Priority: Medium     Maceration of skin 2016     Priority: Medium     Candidiasis of skin 2016     Priority: Medium     Weight gain 2016     Priority: Medium     Visit for annual health examination 2016 10/13/2015     Priority: Medium     Atrophic vaginitis 2015     Priority: Medium     Edema 2015     Priority: Medium     Hyperlipidemia 2015     Priority: Medium     Diagnosis updated by automated process. Provider to  review and confirm.       Neuropathy (H) 01/26/2015     Priority: Medium     Constipation 01/26/2015     Priority: Medium     ACP (advance care planning) 12/04/2014     Priority: Medium     Advance Care Planning 11/15/2016: Receipt of ACP document:  Received: POLST which was signed and dated by provider on 9/9/16.  Document previously scanned on 9/9/16.  Order reviewed and found to be valid.  Code Status reflects choices in most recent ACP document.  Confirmed/documented designated decision maker(s).  Added by Leela Rogers RN, Advance Care Planning Liaison.  Advance Care Planning 12/4/2014: Receipt of ACP document:  Received: POLST which was signed and dated by provider on 12-1-14.  Document previously scanned on 12-1-14.  Order reviewed and found to be valid.  Code Status reflects choices in most recent ACP document.  Confirmed/documented designated decision maker(s). See permanent comments section of demographics in clinical tab. View document(s) and details by clicking on code status. Added by Kim Quiñones RN, System ACP Coordinator [employer].         Dementia 11/24/2014     Priority: Medium     Esophageal reflux 10/31/2014     Priority: Medium     Disturbance of skin sensation  10/08/2014     Priority: Medium     Abscess of right lung with pneumonia, S/P R thoracotomy, total decortication and R LL lobectomy 7-2-14 07/16/2014     Priority: Medium     MS (multiple sclerosis) (H) 07/10/2014     Priority: Medium       Past Medical History   Diagnosis Date     Diabetes (H)      new dx 8/2016     Hypertension      Lung nodule      Multiple sclerosis (H)      Neuromuscular disorder (H)      Vitamin D deficiency        Past Surgical History   Procedure Laterality Date     Gyn surgery       Decorticate lung  7/1/2014     Procedure: DECORTICATE LUNG;  Surgeon: Eyal Echols MD;  Location: [facility] OR     Thoracotomy  7/1/2014     Procedure: THORACOTOMY;  Surgeon: Eyal Echols MD;   Location: [facility] OR     Lobectomy lung  7/1/2014     Procedure: LOBECTOMY LUNG;  Surgeon: Eyal Echlos MD;  Location: [facility] OR     Cystoscopy, retrogrades, insert stent ureter(s), combined Right 12/27/2016     Procedure: COMBINED CYSTOSCOPY, RETROGRADES, INSERT STENT URETER(S);  Surgeon: Quinn Hawthorne MD;  Location: [facility] OR     Combined cystoscopy, insert stent ureter(s) Right 12/27/2016     Procedure: COMBINED CYSTOSCOPY, INSERT STENT URETER(S);  Surgeon: Quinn Hawthorne MD;  Location: [facility] OR       No family history on file.    Social History   Substance Use Topics     Smoking status: Current Every Day Smoker     Packs/day: 1.00     Years: 60.00     Types: Cigarettes     Smokeless tobacco: Never Used      Comment: currently only smoking 4 cigs a day     Alcohol use No          Allergies   Allergen Reactions     Dilaudid [Hydromorphone] Hives     Per patient      Questran [Cholestyramine] Rash     Itchy rash     Current Outpatient Prescriptions   Medication Sig Dispense Refill     oxyCODONE (ROXICODONE) 5 MG IR tablet Take 1 tablet (5 mg) by mouth every 6 hours And every 6 hrs PRN 30 tablet 0     Nutritional Supplements (IMELDA) PACK Take 1 packet by mouth 2 times daily 60 packet 1     ipratropium - albuterol 0.5 mg/2.5 mg/3 mL (DUONEB) 0.5-2.5 (3) MG/3ML neb solution Take 1 vial by nebulization 4 times daily       Furosemide (LASIX PO) Take 30 mg by mouth daily       enoxaparin (LOVENOX) 40 MG/0.4ML injection Inject 0.4 mLs (40 mg) Subcutaneous every 24 hours       metoprolol (LOPRESSOR) 25 MG tablet Take 1 tablet (25 mg) by mouth 2 times daily 60 tablet      miconazole (MICATIN; MICRO GUARD) 2 % powder Apply topically every hour as needed for other (topical candidiasis)       GABAPENTIN PO Take 600 mg by mouth 2 times daily       Multiple Vitamins-Minerals (MULTIVITAL PO) Take 1 tablet by mouth every morning       Calcium Carb-Cholecalciferol 500-200 MG-UNIT TABS Take 1 tablet by mouth 2 times  "daily       acetaminophen (TYLENOL) 325 MG tablet Take 1,000 mg by mouth 3 times daily        polyethylene glycol (MIRALAX/GLYCOLAX) powder Take 17 g by mouth 2 times daily        Dalfampridine (AMPYRA) 10 MG TB12 Take 10 mg by mouth daily        CITALOPRAM HYDROBROMIDE PO Take 20 mg by mouth daily         REVIEW OF SYSTEMS:  No chest pain, fevers, chills, headache, nausea, vomiting or bowel abnormalities.  Appetite is  fair.  Generalized chronic moderate to severe pain, CONSTITUTIONAL:  fatigue and forgetfulness, CV:  lower extremity edema and known recent DVT, RESPIRATORY: cough and sats 94% RA but using O2 for comfort 92-96% with oxygen, known lung cancer DX, :  Ureter stent in place, recent UTI, NEURO:  weakness, gait disturbance and diagnosis MS, MUSCULOSKELETAL: leg pain and weakness with MS and hx bilateral femur fractures 2016, HEME/LYMPH: anemia of chronic disease, ENDO: diet controlled diabetic and SKIN: deep tissue injuries to each heel and each calf.    EXAM:  /61  Pulse 73  Temp 98.1  F (36.7  C)  Resp 16  Ht 5' 6\" (1.676 m)  Wt 191 lb 6.4 oz (86.8 kg)  SpO2 93%  BMI 30.89 kg/m2  GENERAL APPEARANCE:  Alert, pleasant, cooperative, oriented x self and place  EYES:  EOM, lids, pupils and irises normal, sclera clear and conjunctiva normal, no discharge or mattering on lids or lashes noted  ENT:  Mouth normal, moist mucous membranes, nose normal without drainage or crusting, external ears without lesions, hearing acuity intact  RESP:  respiratory effort and palpation of chest normal, no chest wall tenderness, no respiratory distress, Lung sounds clear upper airways and diminished in bases, patient is on oxygen per NC for comfort.   CV:  Palpation and auscultation of heart done, rate and rhythm controlled and regular, no murmur, no rub or gallop. Edema +1 pitting bilateral lower extremities. VASCULAR: deep tissue injury to each heal and also to each posterior lower leg, right posterior calf with some " dark drainage on foam dressing. Surrounding tissue reddened. Heel DTI areas are dry bilaterally.   ABDOMEN:  normal bowel sounds, soft, nontender, no grimacing or guarding with palpation, no hepatosplenomegaly or other masses  M/S:   Gait and station bedbound and wheelchair bound, Digits and nails normal, generalized LE pain with any passive movement - unable to move legs interdependently.   NEURO: cranial nerves 2-12 grossly intact, no facial asymmetry, no speech deficits and able to follow directions, moves all extremities symmetrically  PSYCH:  insight and judgement impaired, memory forgetful, affect and mood normal     DIAGNOSTICS:   Preop Testing   EKG: Not indicated due to non-vascular surgery and last EKG on 12/24/17 sinus tach, ST & T abnormalities - reviewed with PCP Dr Manjrarez and in view of no CV symptoms and non-vascular procedure OK to proceed without further cardiology work up.   Hemoglobin re-check pending today 2/14/17  Lab Results   Component Value Date    HGB 9.6 02/06/2017     Serum Potassium pending today 2/14/17  Lab Results   Component Value Date    POTASSIUM 4.0 02/03/2017     Serum Creatinine pending today 2/14/17  Lab Results   Component Value Date    CR 0.66 02/03/2017     Hemoglobin A1C:   Lab Results   Component Value Date    A1C 5.0 02/06/2017     Coagulation Studies (e.g. INR, PTT, platelet count) on Lovenox daily, will check PTT on 2/16  Lab Results   Component Value Date     02/06/2017     08/10/2016       Chest XRay: not needed, no new respiratory symptoms. Oxygen per NC PRN to keep sats above 89% and for comfort. Uses periodically throughout the day.     Labs Drawn and in Process at this time.     IMPRESSION:  Reason for surgery/procedure: kidney stones, ureter stent in place.     The proposed surgical procedure is considered INTERMEDIATE risk.    For above listed surgery and anesthesia:   Patient is at MODERATE risk for surgery/procedure and perioperative/procedure  complications.    RECOMMENDATIONS:    --Approval given to proceed with proposed procedure, without further diagnostic evaluation other than mentioned above.   --Patient is currently taking    Anticoagulant or Antiplatelet Medication Use  Currently on daily Lovenox - hold Lovenox on day of surgery 2/21, resume after surgery.       --Patient has   Pulmonary Risk  Incentive spirometry post op  Advised smoking cessation.     --Patient has  Anemia and does not require treatment prior to surgery.  Monitor Hemoglobin postoperatively.  --Because of DVT or PE history, patient should resume Lovenox as previously ordered after surgery. AM dose of Lovenox will be held morning of procedure.   --Patient is to take all other scheduled medications on the day of surgery       Signed Electronically by: RE Kaufman CNP     ADDENDUM    At 1200 reviewed labs from 2/14/17:  Na 142, K 3.8, Cr 0.7, BUN 25, WBC 7.8, HGB 10.3, .   OK to proceed with surgery as noted above. Will check PTT on 2/16/17    Electronically signed by RE Kaufman, GNP

## 2017-02-21 NOTE — OR NURSING
Surgery cancelled per Dr Hawthorne+ anesthesia due to lung congestion   120 minutes face to face time  [facility] + transport notified

## 2017-02-21 NOTE — OR NURSING
0750 anesthesia Dr Ray here to see pt, call placed to Dr Hawthorne re pts congested cough/crackles/wheezing

## 2017-02-21 NOTE — ANESTHESIA PREPROCEDURE EVALUATION
Anesthesia Evaluation     .        ROS/MED HX    ENT/Pulmonary:       Neurologic:       Cardiovascular:     (+) ----. : . . . :. . Previous cardiac testing [place]:results:Interpretation Summary     Technically difficult study.  Left ventricular systolic function is moderately reduced.  The visual ejection fraction is estimated at 35-40%.  There is moderate to severe inferolateral wall hypokinesis.  E by E prime ratio is greater than 15, that likely suggests increased left  ventricular filling pressures.  ______________________________________________________________________________           Left Ventricle  The left ventricle is normal in size. Left ventricular hypertrophy is noted  by two-dimensional echocardiography. Grade I or early diastolic dysfunction.  E by E prime ratio is greater than 15, that likely suggests increased left  ventricular filling pressures. Left ventricular systolic function is  moderately reduced. The visual ejection fraction is estimated at 35-40%.  There is moderate to severe inferolateral wall hypokinesis.     Right Ventricle  The right ventricle is grossly normal size. The right ventricular systolic  function is normal. The right ventricle is not well visualized.  Atria  The left atrium is not well visualized. Normal left atrial size.     Mitral Valve  The mitral valve is not well visualized. There is mild mitral annular  calcification. There is mild (1+) mitral regurgitation. There is no mitral  valve stenosis.     Tricuspid Valve  The tricuspid valve is not well visualized. Right ventricular systolic  pressure could not be approximated due to inadequate tricuspid regurgitation.     Aortic Valve  The aortic valve is not well visualized. No aortic regurgitation is present.  No hemodynamically significant valvular aortic stenosis.     Pulmonic Valve  The pulmonic valve is not well visualized.     Vessels  Normal size aorta. The inferior vena cava is not dilated.  Pericardium  Trivial  pericardial effusion.     Rhythm  The rhythm was normal sinus.  date: results: date: results: date: results:          METS/Exercise Tolerance:     Hematologic:         Musculoskeletal:         GI/Hepatic:         Renal/Genitourinary:         Endo:         Psychiatric:         Infectious Disease:         Malignancy:         Other:                                                   .

## 2017-03-01 NOTE — PROGRESS NOTES
[facility]    Chief Complaint   Patient presents with     RECHECK       HPI:        Adwoa Peralta is a 76 year old  (1940), who is being seen today for an episodic care visit at [facility]. Today's concern is:    Acute deep vein thrombosis (DVT) of tibial vein of left lower extremity (H)  Ureteral stone  Dementia without behavioral disturbance, unspecified dementia type  Patient continues on Lovenox daily. Plan was to transition back to oral anticoagulant once cystoscopy completed. However, due to questions re: dementia and ability to sign consent procedure cancelled and re-scheduled for 3/8. Freeman Guillen notified that he needs to accompany Adwoa to procedure. Adwoa denies urinary symptoms.     Chronic pain of both lower extremities  Ongoing pain in LE bilaterally. On scheduled Tylenol, scheduled Oxycodone 5 mg PO every 6 hrs. Patient also has PRN Oxycodone - uses sporadically, some days 1-2 doses and some days not at all. She does say medication is helpful when used.     Non-small cell cancer of left lung (H)  Patient with obvious decline in status. Weaker, loosing weight. Now has pressure ulcers on back of each lower leg - sees wound team in house. WEIGHT: 2/2/17 193.2 LBS AND 2/27/17 162.3 LBS and BP: /47-86  BG: A.M.: 120-133, NOON: 152-194, P.M.: 112-192.   Staff and this NP left VM for freeman Guillen re: weight loss, decline in condition. Adwoa states she may consider changing goals of care to comfort but at this point is scheduled with radiation oncology for f/u in April.       ALLERGIES: Dilaudid [hydromorphone] and Questran [cholestyramine]  Past Medical, Surgical, Family and Social History reviewed and updated in [employer].    Current Outpatient Prescriptions   Medication Sig Dispense Refill     CITALOPRAM HYDROBROMIDE PO Take 20 mg by mouth daily       collagenase ointment Apply topically every evening       lidocaine (XYLOCAINE) 2 % topical gel Apply topically every  evening apply daily 50/50 with santyl       oxyCODONE (ROXICODONE) 5 MG IR tablet Take 1 tablet (5 mg) by mouth every 6 hours And every 6 hrs PRN 30 tablet 0     Nutritional Supplements (IMELDA) PACK Take 1 packet by mouth 2 times daily 60 packet 1     ipratropium - albuterol 0.5 mg/2.5 mg/3 mL (DUONEB) 0.5-2.5 (3) MG/3ML neb solution Take 1 vial by nebulization 4 times daily       Furosemide (LASIX PO) Take 30 mg by mouth daily       enoxaparin (LOVENOX) 40 MG/0.4ML injection Inject 0.4 mLs (40 mg) Subcutaneous every 24 hours       metoprolol (LOPRESSOR) 25 MG tablet Take 1 tablet (25 mg) by mouth 2 times daily 60 tablet      miconazole (MICATIN; MICRO GUARD) 2 % powder Apply topically every hour as needed for other (topical candidiasis)       GABAPENTIN PO Take 600 mg by mouth 2 times daily       Multiple Vitamins-Minerals (MULTIVITAL PO) Take 1 tablet by mouth every morning       Calcium Carb-Cholecalciferol 500-200 MG-UNIT TABS Take 1 tablet by mouth 2 times daily       acetaminophen (TYLENOL) 325 MG tablet Take 1,000 mg by mouth 3 times daily        polyethylene glycol (MIRALAX/GLYCOLAX) powder Take 17 g by mouth 2 times daily        Dalfampridine (AMPYRA) 10 MG TB12 Take 10 mg by mouth daily        Medications reviewed:  Medications reconciled to facility chart and changes were made to reflect current medications as identified as above med list. Below are the changes that were made:   Medications stopped since last EPIC medication reconciliation:   There are no discontinued medications.    Medications started since last [facility] medication reconciliation:  Orders Placed This Encounter   Medications     CITALOPRAM HYDROBROMIDE PO     Sig: Take 20 mg by mouth daily     collagenase ointment     Sig: Apply topically every evening     lidocaine (XYLOCAINE) 2 % topical gel     Sig: Apply topically every evening apply daily 50/50 with santyl       REVIEW OF SYSTEMS:  10 point ROS of systems including Constitutional, Eyes,  "Respiratory, Cardiovascular, Gastroenterology, Genitourinary, Integumentary, Musculoskeletal, Psychiatric were all negative except for pertinent positives noted in my HPI.    Physical Exam:  BP 97/46  Pulse 95  Temp 97.7  F (36.5  C)  Resp 16  Ht 5' 6\" (1.676 m)  Wt 162 lb 4.8 oz (73.6 kg)  SpO2 96%  BMI 26.2 kg/m2  GENERAL APPEARANCE:  Alert, in no distress, pleasant, cooperative, oriented x self, place and recent events.   EYES:  EOM, lids, pupils and irises normal, sclera clear and conjunctiva normal, no discharge or mattering on lids or lashes noted  ENT:  Mouth normal, moist mucous membranes, nose normal without drainage or crusting, external ears without lesions, hearing acuity intact  RESP:  respiratory effort and palpation of chest normal, no chest wall tenderness, no respiratory distress, Lung sounds diminished both lower lobes, patient is on room air, no cough  CV:  Palpation and auscultation of heart done, rate and rhythm controlled and regular, no murmur, no rub or gallop. Edema +2 pitting bilateral lower extremities. VASCULAR: ulcers on back of each calf covered with dressing.   ABDOMEN:  normal bowel sounds, soft, nontender, no grimacing or guarding with palpation, no hepatosplenomegaly or other masses  M/S:   Gait and station bedbound and wheelchair bound, Digits and nails normal. Generalized weakness, unable to move lower extremities  NEURO: no facial asymmetry, no speech deficits and able to follow directions  PSYCH:  insight and judgement impaired, memory forgetful, affect and mood withdrawn and quiet.     Recent Labs:      CBC RESULTS:   Recent Labs   Lab Test 02/14/17 02/06/17   WBC  7.8  10.2   RBC  3.23*  3.06*   HGB  10.3*  9.6*   HCT  33.6*  31.4*   MCV  104*  103*   MCH  32  31   MCHC  31*  31*   RDW  19.8*  20.2*   PLT  354  388       Last Basic Metabolic Panel:  Recent Labs   Lab Test 02/14/17 02/03/17   NA  142  138   POTASSIUM  3.8  4.0   CHLORIDE  103  102   CAITLYN  9.2  8.9   CO2  31  26 "   BUN  25*  16   CR  0.70  0.66   GLC  97  121*       Liver Function Studies:  Recent Labs   Lab Test 01/06/17 01/04/17   0714  12/24/16   1115   PROTTOTAL   --   5.0*  7.2   ALBUMIN   --   1.2*  1.5*   BILITOTAL   --   0.2  0.3   ALKPHOS   --   101  158*   AST  14  13  30   ALT   --   <6  34       TSH   Date Value Ref Range Status   05/04/2016 1.67 mcU/mL Final       Lab Results   Component Value Date    A1C 5.0 02/06/2017    A1C 6.3 11/01/2016       Assessment/Plan:  Acute deep vein thrombosis (DVT) of tibial vein of left lower extremity (H)  Chronic, Lovenox as above. Consider Coumadin after procedure completed.     Chronic pain of both lower extremities  Chronic, stable and adequate control with current meds.     Non-small cell cancer of left lung (H)  Chronic. F/U with radiation oncology ordered.     Ureteral stone  Requires a cystoscopy - scheduled for next week, son Wilmer Christianson updated via VM x 2 of need to accompany.     Dementia without behavioral disturbance, unspecified dementia type  Chronic. Failing r/t cancer DX. Offered family option of hospice consult.     Orders:  No changes today.     Total time spent with patient visit was 25 min including patient visit, review of past records, phone call to patient contact and discussion of patient status and POC with staff. Greater than 50% of total time spent with counseling and coordinating care.     Electronically signed by  RE Kaufman CNP

## 2017-03-01 NOTE — MR AVS SNAPSHOT
After Visit Summary   3/1/2017    Adwoa Peralta    MRN: [id number]           Patient Information     Date Of Birth          1940        Visit Information        Provider Department      3/1/2017 12:00 PM Jackelyn Dixon APRN CNP [facility]        Today's Diagnoses     Acute deep vein thrombosis (DVT) of tibial vein of left lower extremity (H)    -  1    Chronic pain of both lower extremities        Non-small cell cancer of left lung (H)        Ureteral stone        Dementia without behavioral disturbance, unspecified dementia type           Follow-ups after your visit        Your next 10 appointments already scheduled     Mar 07, 2017  9:00 AM CST   Nursing Home with Natalya Manjarrez MD   [facility]    [address]  [facility] [address]   [phone]            Mar 08, 2017   Procedure with Quinn Hawthorne MD   [facility] (--)    [address]  [facility] [address]   [phone]            Mar 31, 2017 10:40 AM CDT   CT CHEST W/O CONTRAST with UUCT1   [facility], [place], [place] ([facility], [facility])    [address] [facility] [id number] [phone]           Please bring any scans or X-rays taken at other hospitals, if similar tests were done. Also bring a list of your medicines, including vitamins, minerals and over-the-counter drugs. It is safest to leave personal items at home.  Be sure to tell your doctor:   If you have any allergies.   If there s any chance you are pregnant.   If you are breastfeeding.   If you have any special needs.  You do not need to do anything special to prepare.  Please wear loose clothing, such as a sweat suit or jogging clothes. Avoid snaps, zippers and other metal. We may ask you to undress and put on a hospital gown.            Apr 05, 2017 10:00 AM CDT   Return Visit with Iggy Arce  "MD [place]   Radiation Oncology Clinic ([facility])    [facility]  1st Floor  [address] [facility] [address] [phone]              Who to contact     If you have questions or need follow up information about today's clinic visit or your schedule please contact [facility] directly at [phone].  Normal or non-critical lab and imaging results will be communicated to you by MyChart, letter or phone within 4 business days after the clinic has received the results. If you do not hear from us within 7 days, please contact the clinic through MyChart or phone. If you have a critical or abnormal lab result, we will notify you by phone as soon as possible.  Submit refill requests through [employer] or call your pharmacy and they will forward the refill request to us. Please allow 3 business days for your refill to be completed.          Additional Information About Your Visit        [employer] Information     [employer] lets you send messages to your doctor, view your test results, renew your prescriptions, schedule appointments and more. To sign up, go to [email or web address] . Click on \"Log in\" on the left side of the screen, which will take you to the Welcome page. Then click on \"Sign up Now\" on the right side of the page.     You will be asked to enter the access code listed below, as well as some personal information. Please follow the directions to create your username and password.     Your access code is: [email or web address]  Expires: 4/3/2017  8:42 AM     Your access code will  in 90 days. If you need help or a new code, please call your [facility] clinic or [phone].        Care EveryWhere ID     This is your Care EveryWhere ID. This could be used by other organizations to access your [facility] medical records  [id number]        Your Vitals Were     Pulse Temperature Respirations Height Pulse Oximetry BMI (Body Mass Index)    95 97.7  F (36.5  C) 16 " "5' 6\" (1.676 m) 96% 26.2 kg/m2       Blood Pressure from Last 3 Encounters:   02/28/17 97/46   02/21/17 125/81   02/13/17 113/61    Weight from Last 3 Encounters:   02/28/17 162 lb 4.8 oz (73.6 kg)   02/21/17 173 lb 11.6 oz (78.8 kg)   02/13/17 191 lb 6.4 oz (86.8 kg)              Today, you had the following     No orders found for display       Primary Care Provider    None Specified       No primary provider on file.        Thank you!     Thank you for choosing [facility]  for your care. Our goal is always to provide you with excellent care. Hearing back from our patients is one way we can continue to improve our services. Please take a few minutes to complete the written survey that you may receive in the mail after your visit with us. Thank you!             Your Updated Medication List - Protect others around you: Learn how to safely use, store and throw away your medicines at www.disposemymeds.org.          This list is accurate as of: 3/1/17  1:54 PM.  Always use your most recent med list.                   Brand Name Dispense Instructions for use    acetaminophen 325 MG tablet    TYLENOL     Take 1,000 mg by mouth 3 times daily       AMPYRA 10 MG Tb12   Generic drug:  Dalfampridine      Take 10 mg by mouth daily       Calcium Carb-Cholecalciferol 500-200 MG-UNIT Tabs      Take 1 tablet by mouth 2 times daily       CITALOPRAM HYDROBROMIDE PO      Take 20 mg by mouth daily       collagenase ointment      Apply topically every evening       enoxaparin 40 MG/0.4ML injection    LOVENOX     Inject 0.4 mLs (40 mg) Subcutaneous every 24 hours       GABAPENTIN PO      Take 600 mg by mouth 2 times daily       ipratropium - albuterol 0.5 mg/2.5 mg/3 mL 0.5-2.5 (3) MG/3ML neb solution    DUONEB     Take 1 vial by nebulization 4 times daily       IMELDA Pack     60 packet    Take 1 packet by mouth 2 times daily       LASIX PO      Take 30 mg by mouth daily       lidocaine 2 % topical gel    XYLOCAINE     " Apply topically every evening apply daily 50/50 with santyl       metoprolol 25 MG tablet    LOPRESSOR    60 tablet    Take 1 tablet (25 mg) by mouth 2 times daily       miconazole 2 % powder    MICATIN; MICRO GUARD     Apply topically every hour as needed for other (topical candidiasis)       MULTIVITAL PO      Take 1 tablet by mouth every morning       oxyCODONE 5 MG IR tablet    ROXICODONE    30 tablet    Take 1 tablet (5 mg) by mouth every 6 hours And every 6 hrs PRN       polyethylene glycol powder    MIRALAX/GLYCOLAX     Take 17 g by mouth 2 times daily

## 2017-03-07 NOTE — OR NURSING
Recalled [facility] recalled re needing physical clearance for tomorrow procedure

## 2017-03-07 NOTE — OR NURSING
Recalled re needing clearance from NP or house physician for procedure(cancelled 2 weeks ago due to resp status

## 2017-03-07 NOTE — PROGRESS NOTES
[facility]  Nursing Home Regulatory Visit  March 7, 2017    Chief Complaint   Patient presents with     care home Regulatory       HPI:    Adwoa Peralta is a 76 year old  (1940), who is being seen today for a federally mandated E/M visit at [facility]. 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 Had definitive treatment with XRT (completed late January) with plan for repeat CT chest in April.   2) Stress Induced Cardiomyopathy (EF 35-40%) -- diagnosed in January in setting of sepsis. Continues on furosemide 30 mg daily and metoprolol 25 mg BID. Pending goals of care, would defer repeat TTE if focus shifts more towards comfort.   3) Right Ureteral Stones s/p R Ureteral Stent (12/27/16) -- is scheduled for a cysto, lithotripsy and stent placement with Dr. Hawthorne on 3/8. Pre-op completed on 2/13 and is in [facility]. No contraindications seen today on my visit.     ALLERGIES: Dilaudid [hydromorphone] and Questran [cholestyramine]    PAST MEDICAL HISTORY:   Past Medical History   Diagnosis Date     Cancer (H)      Diabetes (H)      new dx 8/2016     History of blood transfusion      Hypertension      Lung nodule      Multiple sclerosis (H)      Neuromuscular disorder (H)      Vitamin D deficiency        PAST SURGICAL HISTORY:   Past Surgical History   Procedure Laterality Date     Gyn surgery       Decorticate lung  7/1/2014     Procedure: DECORTICATE LUNG;  Surgeon: Eyal Echols MD;  Location:  OR     Thoracotomy  7/1/2014     Procedure: THORACOTOMY;  Surgeon: Eyal Echols MD;  Location:  OR     Lobectomy lung  7/1/2014     Procedure: LOBECTOMY LUNG;  Surgeon: Eyal Echols MD;  Location:  OR     Cystoscopy, retrogrades, insert stent ureter(s), combined Right 12/27/2016     Procedure: COMBINED  CYSTOSCOPY, RETROGRADES, INSERT STENT URETER(S);  Surgeon: Quinn Hawthorne MD;  Location:  OR     Combined cystoscopy, insert stent ureter(s) Right 12/27/2016     Procedure: COMBINED CYSTOSCOPY, INSERT STENT URETER(S);  Surgeon: Quinn Hawthorne MD;  Location:  OR       FAMILY HISTORY:   No family history on file.    SOCIAL HISTORY:   Lives in a SNF    MEDICATIONS:  Current Outpatient Prescriptions   Medication Sig Dispense Refill     CITALOPRAM HYDROBROMIDE PO Take 20 mg by mouth daily       collagenase ointment Apply topically every evening       lidocaine (XYLOCAINE) 2 % topical gel Apply topically every evening apply daily 50/50 with santyl       oxyCODONE (ROXICODONE) 5 MG IR tablet Take 1 tablet (5 mg) by mouth every 6 hours And every 6 hrs PRN 30 tablet 0     Nutritional Supplements (IMELDA) PACK Take 1 packet by mouth 2 times daily 60 packet 1     ipratropium - albuterol 0.5 mg/2.5 mg/3 mL (DUONEB) 0.5-2.5 (3) MG/3ML neb solution Take 1 vial by nebulization 4 times daily       Furosemide (LASIX PO) Take 30 mg by mouth daily       enoxaparin (LOVENOX) 40 MG/0.4ML injection Inject 0.4 mLs (40 mg) Subcutaneous every 24 hours       metoprolol (LOPRESSOR) 25 MG tablet Take 1 tablet (25 mg) by mouth 2 times daily 60 tablet      miconazole (MICATIN; MICRO GUARD) 2 % powder Apply topically every hour as needed for other (topical candidiasis)       GABAPENTIN PO Take 600 mg by mouth 2 times daily       Multiple Vitamins-Minerals (MULTIVITAL PO) Take 1 tablet by mouth every morning       Calcium Carb-Cholecalciferol 500-200 MG-UNIT TABS Take 1 tablet by mouth 2 times daily       acetaminophen (TYLENOL) 325 MG tablet Take 1,000 mg by mouth 3 times daily        polyethylene glycol (MIRALAX/GLYCOLAX) powder Take 17 g by mouth 2 times daily        Dalfampridine (AMPYRA) 10 MG TB12 Take 10 mg by mouth daily          Medications reviewed:  Medications reconciled to facility chart and changes were made to  "reflect current medications as identified as above med list. Below are the changes that were made:   Medications stopped since last EPIC medication reconciliation:   There are no discontinued medications.  Medications started since last [facility] medication reconciliation:  No orders of the defined types were placed in this encounter.    Case Management:  I have reviewed the care plan and MDS and do agree with the plan.   Information reviewed:  Medications, vital signs, orders, and nursing notes.    ROS:  Unable to be obtained due to cognitive impairment or aphasia.     PHYSICAL EXAM:  /70  Pulse 80  Temp 97.7  F (36.5  C)  Resp 16  Ht 5' 6\" (1.676 m)  Wt 160 lb 9.6 oz (72.8 kg)  SpO2 96%  BMI 25.92 kg/m2  Gen: sitting in wheelchair, alert and in no acute distress  HEENT: normocephalic; oropharynx clear  Card: RRR, S1, S2, no murmurs  Resp: lungs with a couple of scattered crackles that cleared with inspiration, no wheezing and overall clear to auscultation bilaterally  GI: abdomen soft, not-tender  Ext: bilateral 2+ LE edema  Neuro: CX II-XII grossly in tact; ROM in upper extremities grossly in tact  Psych: memory, judgement and insight impaired    Lab/Diagnostic data:  Reviewed as per Epic    ASSESSMENT/PLAN    1) Left Lower Lobe Non-Small Cell Lung Cancer   Overall has had a clinical decline, including weight loss and now pressure ulcers on bilateral lower extremities. Had definitive treatment with XRT (completed late January) with plan for repeat CT chest in April.   -- PCP has talked with Nancy re: goals of care  -- based on my interaction today, Adwoa does not have capacity at this time to make goals of care decisions  -- given overall decline, I would support a more comfort focused care plan    2) Stress Induced Cardiomyopathy (EF 35-40%)   Diagnosed in January in setting of sepsis.   -- continues on furosemide 30 mg daily and metoprolol 25 mg BID  -- follow BPs, clinical volume status/weights  -- " pending goals of care, would defer repeat TTE if focus shifts more towards comfort.     3) Right Ureteral Stones s/p R Ureteral Stent (12/27/16)  She is scheduled for a cysto, lithotripsy and stent placement with Dr. Hawthorne on 3/8. Pre-op completed on 2/13 and is in Epic. Based on my exam today, no contraindications for proposed procedure tomorrow. Facility has been communicating with her son as he needs to be present to sign forms.   -- ongoing management per urology       Electronically signed by:  Natalya Manjarrez MD

## 2017-03-07 NOTE — OR NURSING
0983 spoke with nurse+ nursing assistant re pts consent(?she alert ? POA to sign? Her son aware of procedure)  Discussed meds given + held, + complete MAR being sent

## 2017-03-08 NOTE — PLAN OF CARE
"Called report to [facility], nurse at [facility].  Faxed Rx for Cipro and [employer] orders.  Sending hard copy with patient.  VSS. Meets all discarge criteria and verbalizes readiness to \"go home\".  Tolerating PO and denies pain.  Urine light ana.   "

## 2017-03-08 NOTE — ANESTHESIA CARE TRANSFER NOTE
Patient: Adwoa Peralta    Procedure(s):   CYSTOSCOPY, RIGHT DIAGNOSTIC URETEROSCOPY,  RIGHT STENT REMOVAL - Wound Class: II-Clean Contaminated   - Wound Class: II-Clean Contaminated    Diagnosis: RIGHT URETERAL STONE   Diagnosis Additional Information: No value filed.    Anesthesia Type:   General, LMA     Note:  Airway :Face Mask  Patient transferred to:PACU  Comments: VSS. Airway and IV patent. Patient comfortable. Report to RN. Stable care transfer.      Vitals: (Last set prior to Anesthesia Care Transfer)    CRNA VITALS  3/8/2017 1329 - 3/8/2017 1408      3/8/2017             NIBP: 123/69    Pulse: 74    NIBP Mean: 86    SpO2: 98 %    Resp Rate (set): 10                Electronically Signed By: RE Wen CRNA  March 8, 2017  2:08 PM

## 2017-03-08 NOTE — ANESTHESIA PREPROCEDURE EVALUATION
Procedure: Procedure(s):  COMBINED CYSTOSCOPY, URETEROSCOPY, LASER HOLMIUM LITHOTRIPSY URETER(S), INSERT STENT  Preop diagnosis: RIGHT URETERAL STONE   Allergies   Allergen Reactions     Dilaudid [Hydromorphone] Hives     Per patient      Questran [Cholestyramine] Rash     Itchy rash     Patient Active Problem List   Diagnosis     MS (multiple sclerosis) (H)     Abscess of right lung with pneumonia, S/P R thoracotomy, total decortication and R LL lobectomy 7-2-14     Disturbance of skin sensation      Esophageal reflux     Dementia     ACP (advance care planning)     Neuropathy (H)     Constipation     Hyperlipidemia     Edema     Atrophic vaginitis     Visit for annual health examination 9/9/2016     Weight gain     Maceration of skin     Candidiasis of skin     Bilateral Femur fracture (H)     Hyperglycemia     Skin breakdown     Type 2 diabetes mellitus with diabetic neuropathy (H) A1C 6.6 on 7/20/16     Benign essential hypertension     Acute deep vein thrombosis (DVT) of tibial vein of left lower extremity (H)     Encounter for therapeutic drug monitoring     Long term (current) use of anticoagulants     Chronic pain of both lower extremities     Non-small cell cancer of left lung (H)     Generalized muscle weakness     Past Medical History   Diagnosis Date     Cancer (H)      Diabetes (H)      new dx 8/2016     Gastroesophageal reflux disease      History of blood transfusion      Hyperlipidemia      Hypertension      Lung cancer (H)      Lung nodule      Multiple sclerosis (H)      Neuromuscular disorder (H)      Oxygen dependent      Vitamin D deficiency      Past Surgical History   Procedure Laterality Date     Gyn surgery       Decorticate lung  7/1/2014     Procedure: DECORTICATE LUNG;  Surgeon: Eyal Echols MD;  Location: [facility] OR     Thoracotomy  7/1/2014     Procedure: THORACOTOMY;  Surgeon: Eyal Echols MD;  Location: [facility] OR     Lobectomy lung  7/1/2014     Procedure:  "LOBECTOMY LUNG;  Surgeon: Eyal Echols MD;  Location:  OR     Cystoscopy, retrogrades, insert stent ureter(s), combined Right 12/27/2016     Procedure: COMBINED CYSTOSCOPY, RETROGRADES, INSERT STENT URETER(S);  Surgeon: Quinn Hawthorne MD;  Location:  OR     Combined cystoscopy, insert stent ureter(s) Right 12/27/2016     Procedure: COMBINED CYSTOSCOPY, INSERT STENT URETER(S);  Surgeon: Quinn Hawthorne MD;  Location:  OR       No current facility-administered medications on file prior to encounter.   Current Outpatient Prescriptions on File Prior to Encounter:  oxyCODONE (ROXICODONE) 5 MG IR tablet Take 1 tablet (5 mg) by mouth every 6 hours And every 6 hrs PRN   Nutritional Supplements (IMELDA) PACK Take 1 packet by mouth 2 times daily   ipratropium - albuterol 0.5 mg/2.5 mg/3 mL (DUONEB) 0.5-2.5 (3) MG/3ML neb solution Take 1 vial by nebulization 4 times daily   Furosemide (LASIX PO) Take 30 mg by mouth daily   enoxaparin (LOVENOX) 40 MG/0.4ML injection Inject 0.4 mLs (40 mg) Subcutaneous every 24 hours   metoprolol (LOPRESSOR) 25 MG tablet Take 1 tablet (25 mg) by mouth 2 times daily   GABAPENTIN PO Take 600 mg by mouth 2 times daily   Multiple Vitamins-Minerals (MULTIVITAL PO) Take 1 tablet by mouth every morning   Calcium Carb-Cholecalciferol 500-200 MG-UNIT TABS Take 1 tablet by mouth 2 times daily   acetaminophen (TYLENOL) 325 MG tablet Take 1,000 mg by mouth 3 times daily    polyethylene glycol (MIRALAX/GLYCOLAX) powder Take 17 g by mouth 2 times daily    Dalfampridine (AMPYRA) 10 MG TB12 Take 10 mg by mouth daily    miconazole (MICATIN; MICRO GUARD) 2 % powder Apply topically every hour as needed for other (topical candidiasis)     /44  Temp 35.7  C (96.2  F) (Temporal)  Resp 24  Ht 1.676 m (5' 6\")  Wt 72.6 kg (160 lb)  SpO2 100%  BMI 25.82 kg/m2    Lab Results   Component Value Date    WBC 7.8 02/14/2017     Lab Results   Component Value Date    RBC 3.23 02/14/2017 "     Lab Results   Component Value Date    HGB 10.3 02/14/2017     Lab Results   Component Value Date    HCT 33.6 02/14/2017     Lab Results   Component Value Date     02/14/2017     Lab Results   Component Value Date    MCH 32 02/14/2017     Lab Results   Component Value Date    MCHC 31 02/14/2017     Lab Results   Component Value Date    RDW 19.8 02/14/2017     Lab Results   Component Value Date     02/14/2017     08/10/2016     Lab Results   Component Value Date    INR 1.2 02/17/2017       Last Basic Metabolic Panel:  Lab Results   Component Value Date     02/14/2017      Lab Results   Component Value Date    POTASSIUM 3.8 02/14/2017     Lab Results   Component Value Date    CHLORIDE 103 02/14/2017     Lab Results   Component Value Date    CAITLYN 9.2 02/14/2017     Lab Results   Component Value Date    CO2 31 02/14/2017     Lab Results   Component Value Date    BUN 25 02/14/2017     Lab Results   Component Value Date    CR 0.70 02/14/2017     Lab Results   Component Value Date    GLC 97 02/14/2017     Echo 12/2016  Technically difficult study.  Left ventricular systolic function is moderately reduced.  The visual ejection fraction is estimated at 35-40%.  There is moderate to severe inferolateral wall hypokinesis.  E by E prime ratio is greater than 15, that likely suggests increased left  ventricular filling pressures.  ______________________________________________________________________________           Left Ventricle  The left ventricle is normal in size. Left ventricular hypertrophy is noted  by two-dimensional echocardiography. Grade I or early diastolic dysfunction.  E by E prime ratio is greater than 15, that likely suggests increased left  ventricular filling pressures. Left ventricular systolic function is  moderately reduced. The visual ejection fraction is estimated at 35-40%.  There is moderate to severe inferolateral wall hypokinesis.     Right Ventricle  The right ventricle  is grossly normal size. The right ventricular systolic  function is normal. The right ventricle is not well visualized.  Atria  The left atrium is not well visualized. Normal left atrial size.     Mitral Valve  The mitral valve is not well visualized. There is mild mitral annular  calcification. There is mild (1+) mitral regurgitation. There is no mitral  valve stenosis.     Tricuspid Valve  The tricuspid valve is not well visualized. Right ventricular systolic  pressure could not be approximated due to inadequate tricuspid regurgitation.     Aortic Valve  The aortic valve is not well visualized. No aortic regurgitation is present.  No hemodynamically significant valvular aortic stenosis.     Pulmonic Valve  The pulmonic valve is not well visualized.     Vessels  Normal size aorta. The inferior vena cava is not dilated.  Pericardium  Trivial pericardial effusion.    EKG   24-DEC-2016 12:02:29 [facility] ROUTINE RECORD  Sinus tachycardia  ST & T wave abnormality, consider lateral ischemia  Abnormal ECG  When compared with ECG of 05-OCT-2016 10:36,  Premature ventricular complexes are no longer Present  Nonspecific T wave abnormality now evident in Anterior leads  QT has shortened  25mm/s 10mm/mV 150Hz 8.0 SP2 12SL 241 RADHA: 0  Referred by:    Anesthesia Evaluation     . Pt has had prior anesthetic.       ROS/MED HX    ENT/Pulmonary: Comment: Oxygen dependent by nasal cannula    (+), recent URI unresolved . Other pulmonary disease left lower lobe nonsmall cell lung cancer treated with XRT Jan.   (-) sleep apnea   Neurologic:     (+)dementia, Multiple Sclerosis other neuro Encephalopathy    Cardiovascular: Comment: Stress induced cardiomyopathy, EF 35 to 40% diagnosed Jan in setting of sepsis    (+) hypertension----. Taking blood thinners (Lovenox Q 24hrs) : . . NIXON, . :. .       METS/Exercise Tolerance:     Hematologic:     (+) History of blood clots History of Transfusion -      Musculoskeletal:  Comment: Wt loss with pressure ulcers    Generalized muscle weakness and deconditioning        GI/Hepatic:     (+) GERD Asymptomatic on medication,       Renal/Genitourinary: Comment: Right ureteral stone    (+) Nephrolithiasis ,       Endo: Comment: VIT D deficiency    (+) type II DM .      Psychiatric:         Infectious Disease: Comment: Sepsis        Malignancy:   (+) Malignancy History of Lung          Other:               Physical Exam  Normal systems: cardiovascular and pulmonary    Airway   Mallampati: II  TM distance: >3 FB  Neck ROM: full    Dental   (+) lower dentures and missing    Cardiovascular   Rhythm and rate: regular and normal      Pulmonary    breath sounds clear to auscultation    Other findings: Patient very soft spoken                Anesthesia Plan      History & Physical Review  History and physical reviewed and following examination; no interval change.    ASA Status:  4 .    NPO Status:  > 8 hours    Plan for General and LMA with Propofol induction. Maintenance will be Balanced.    PONV prophylaxis:  Ondansetron (or other 5HT-3)       Postoperative Care  Postoperative pain management:  IV analgesics.      Consents  Anesthetic plan, risks, benefits and alternatives discussed with:  Patient..                          .

## 2017-03-08 NOTE — IP AVS SNAPSHOT
[facility] PreOP/Phase II    [address]    [place] [address]    Phone:  [phone]                                       After Visit Summary   3/8/2017    Adwoa Peralta    MRN: [id number]           After Visit Summary Signature Page     I have received my discharge instructions, and my questions have been answered. I have discussed any challenges I see with this plan with the nurse or doctor.    ..........................................................................................................................................  Patient/Patient Representative Signature      ..........................................................................................................................................  Patient Representative Print Name and Relationship to Patient    ..................................................               ................................................  Date                                            Time    ..........................................................................................................................................  Reviewed by Signature/Title    ...................................................              ..............................................  Date                                                            Time

## 2017-03-08 NOTE — OP NOTE
DATE OF PROCEDURE:  03/08/2017       PREOPERATIVE DIAGNOSES:     1.  History of 6 mm right distal ureteral stone.   2.  Urosepsis, resolved.      POSTOPERATIVE DIAGNOSES:     1.  History of 6 mm right distal ureteral stone.   2.  Urosepsis, resolved.       PROCEDURE:  Cystoscopy, right ureteral stent removal, right diagnostic ureteroscopy.       FINDINGS:  Stone had already passed.  Patent ureter.      COMPLICATIONS:  None.      BLOOD LOSS:  None.      ANESTHESIA:  General.      INDICATIONS:  Adwoa Peralta is a 76-year-old woman with multiple medical problems, including advanced multiple sclerosis, who lives in a nursing home.  She presented in late December with urosepsis and had a 6 mm obstructing stone in the right distal ureter.  She was stented at that time and underwent a prolonged course of antibiotics.  She was previously scheduled for procedure a couple of weeks ago, but had a productive cough, and so the case was delayed until now.  She is back to her baseline health and presents for a right ureteroscopy, laser lithotripsy and possible right stent exchange.      DESCRIPTION OF PROCEDURE:  After informed consent had been obtained from the patient's son, she was taken to the operating room and was placed under general anesthetic.  She was positioned in lithotomy and was prepped and draped in the standard fashion.  A timeout was taken.  She was given preoperative doses of Levaquin and vancomycin.  A 22-Moldovan cystoscope was placed in the bladder.  There was a stent on the right side with moderate encrustation; this was externalized.  I was unable to pass a wire up a stent because of stent calcification.  The stent was removed under live fluoroscopy and it uncurled and was removed without much resistance.  The cystoscope was replaced.  A sensor wire was placed up the right ureter and into the right collecting system.  The wire was set aside as a safety wire.  The rigid ureteroscope was navigated up the  ureter, and there was no stone found.  I was able to pass the scope all the way up a very wide open, patent ureter all the way to the ureteropelvic junction.  Again, there was no stone, and there was no sign of any obstruction.  The ureter appeared to be draining very well.  I elected not to place another stent.  The urine in the bladder was somewhat cloudy.  She had some skin breakdown on the perineum and inner thighs.  Nursing noted that she had a very saturated diapers.  I elected to place a Lugo catheter for better management of her bladder.  A 16 Czech Lugo catheter was placed and left to gravity drainage.  She was awoken and taken to recovery in stable condition.      PLAN:  I spoke with the patient's son about the findings.  We will plan to leave the catheter in long term for better bladder management and hopefully help with her hygiene and her care at a nursing home.  Orders were written for them to change the catheter monthly at the [facility].         DAVE KAMARA MD             D: 2017 14:18   T: 2017 16:40   MT: [id number]      Name:     CORAL COOPER   MRN:      [id number]        Account:        [id number]   :      1940           Procedure Date: 2017      Document: [id number]

## 2017-03-08 NOTE — IP AVS SNAPSHOT
MRN:[id number]                      After Visit Summary   3/8/2017    Adwoa Peralta    MRN: [id number]           Thank you!     Thank you for choosing [facility] for your care. Our goal is always to provide you with excellent care. Hearing back from our patients is one way we can continue to improve our services. Please take a few minutes to complete the written survey that you may receive in the mail after you visit with us. Thank you!        Patient Information     Date Of Birth          1940        About your hospital stay     You were admitted on:  March 8, 2017 You last received care in the:  [facility] PreOP/Phase II    You were discharged on:  March 8, 2017       Who to Call     For medical emergencies, please call 911.  For non-urgent questions about your medical care, please call your primary care provider or clinic, None  For questions related to your surgery, please call your surgery clinic        Attending Provider     Provider Specialty    Quinn Hawthorne MD Urology       Primary Care Provider    None Specified       No primary provider on file.        After Care Instructions     Diet Instructions       Resume pre-procedure diet            Discharge Instructions       Patient to follow up PRN                  Your next 10 appointments already scheduled     Mar 31, 2017 10:40 AM CDT   CT CHEST W/O CONTRAST with UUCT1   [facility], [place], [place] ([facility])    [address] [facility] [address] [phone]           Please bring any scans or X-rays taken at other hospitals, if similar tests were done. Also bring a list of your medicines, including vitamins, minerals and over-the-counter drugs. It is safest to leave personal items at home.  Be sure to tell your doctor:   If you have any allergies.   If there s any chance you are pregnant.   If you are breastfeeding.   If you have any special needs.  You do not  need to do anything special to prepare.  Please wear loose clothing, such as a sweat suit or jogging clothes. Avoid snaps, zippers and other metal. We may ask you to undress and put on a hospital gown.            Apr 05, 2017 10:00 AM CDT   Return Visit with Iggy Dubose MD   Radiation Oncology Clinic ([facility])    [facility]  1st Floor  [address] [address] [phone]              Further instructions from your care team       Same Day Surgery Discharge Instructions for  Sedation and General Anesthesia       It's not unusual to feel dizzy, light-headed or faint for up to 24 hours after surgery or while taking pain medication.  If you have these symptoms: sit for a few minutes before standing and have someone assist you when you get up to walk or use the bathroom.      You should rest and relax for the next 24 hours. We recommend you make arrangements to have an adult stay with you for at least 24 hours after your discharge.  Avoid hazardous and strenuous activity.      DO NOT DRIVE any vehicle or operate mechanical equipment for 24 hours following the end of your surgery.  Even though you may feel normal, your reactions may be affected by the medication you have received.      Do not drink alcoholic beverages for 24 hours following surgery.       Slowly progress to your regular diet as you feel able. It's not unusual to feel nauseated and/or vomit after receiving anesthesia.  If you develop these symptoms, drink clear liquids (apple juice, ginger ale, broth, 7-up, etc. ) until you feel better.  If your nausea and vomiting persists for 24 hours, please notify your surgeon.        All narcotic pain medications, along with inactivity and anesthesia, can cause constipation. Drinking plenty of liquids and increasing fiber intake will help.      For any questions of a medical nature, call your surgeon.      Do not make important decisions for 24  hours.      If you had general anesthesia, you may have a sore throat for a couple of days related to the breathing tube used during surgery.  You may use Cepacol lozenges to help with this discomfort.  If it worsens or if you develop a fever, contact your surgeon.       If you feel your pain is not well managed with the pain medications prescribed by your surgeon, please contact your surgeon's office to let them know so they can address your concerns.       **If you have questions or concerns about your procedure,   call Dr. Hawthorne at [phone]**    Reasons to contact your surgeon:    1. Signs of possible infection: Check your incision daily for redness, swelling, warmth, red streaks or foul drainage.   2. Elevated temperature.  3. Pain not controlled with pain medication and/or rest.   4. Uncontrolled nausea or vomiting.  5. Any questions or concerns.      Cystoscopy Discharge Instructions      Diet:    Return to the diet that you were on before the procedure, unless you are given specific diet instructions.    It is important to drink 6-8 glasses of fluids per day at home - at least 3-4 glasses should be water.    Activity:    Walk short distances and increase as your strength allows.    You may climb stairs.    Do not do strenuous exercise or heavy lifting until approved by surgeon.    Do not drive while taking narcotic pain medications.    Bathing:    You may take a shower.    Call your physician if these signs/symptoms are present:    Pain that is not relieved by a short rest or ordered pain medications.    Temperature at or above 101.0 F or chills.    Inability or difficulty urinating.  Excessive blood in urine.    Any questions or concerns.              Warfarin Instruction     You have started taking a medicine called warfarin. This is a blood-thinning medicine (anticoagulant). It helps prevent and treat blood clots.      Before leaving the hospital, make sure you know how much to take and how long to  "take it.      You will need regular blood tests to make sure your blood is clotting safely. It is very important to see your doctor for regular blood tests.    Talk to your doctor before taking any new medicine (this includes over-the-counter drugs and herbal products). Many medicines can interact with warfarin. This may cause more bleeding or too much clotting.     Eating a lot of vitamin K--found in green, leafy vegetables--can change the way warfarin works in your body. Do NOT avoid these foods. Instead, try to eat the same amount each day.     Bleeding is the most common side-effect of warfarin. You may notice bleeding gums, a bloody nose, bruises and bleeding longer when you cut yourself. See a doctor at once if:   o You cough up blood  o You find blood in your stool (poop)  o You have a deep cut, or a cut that bleeds longer than 10 minutes   o You have a bad cut, hard fall, accident or hit your head (go to urgent care or the emergency room).    For women who can get pregnant: This medicine can harm an unborn baby. Be very careful not to get pregnant while taking this medicine. If you think you might be pregnant, call your doctor right away.    For more information, read \"Guide to Warfarin Therapy,  the booklet you received in the hospital.        Pending Results     No orders found from 3/6/2017 to 3/9/2017.            Admission Information     Date & Time Provider Department Dept. Phone    3/8/2017 Quinn Hawthorne MD [facility] PreOP/Phase [phone]      Your Vitals Were     Blood Pressure Temperature Respirations Height Weight Pulse Oximetry    100/51 97.5  F (36.4  C) (Temporal) 14 1.676 m (5' 6\") 72.6 kg (160 lb) 99%    BMI (Body Mass Index)                   25.82 kg/m2           [employer] Information     [employer] lets you send messages to your doctor, view your test results, renew your prescriptions, schedule appointments and more. To sign up, go to [email or web address] . Click on " "\"Log in\" on the left side of the screen, which will take you to the Welcome page. Then click on \"Sign up Now\" on the right side of the page.     You will be asked to enter the access code listed below, as well as some personal information. Please follow the directions to create your username and password.     Your access code is: [email or web address]  Expires: 4/3/2017  8:42 AM     Your access code will  in 90 days. If you need help or a new code, please call your [facility] [place] clinic or [phone].        Care EveryWhere ID     This is your Care EveryWhere ID. This could be used by other organizations to access your [facility] medical records  [id number]           Review of your medicines      START taking        Dose / Directions    ciprofloxacin 500 MG tablet   Commonly known as:  CIPRO   Used for:  Urinary tract infection, site unspecified        Dose:  500 mg   Take 1 tablet (500 mg) by mouth 2 times daily   Quantity:  10 tablet   Refills:  0         CONTINUE these medicines which have NOT CHANGED        Dose / Directions    acetaminophen 325 MG tablet   Commonly known as:  TYLENOL        Dose:  1000 mg   Take 1,000 mg by mouth 3 times daily   Refills:  0       AMPYRA 10 MG Tb12   Indication:  Multiple Sclerosis   Used for:  MS (multiple sclerosis) (H)   Generic drug:  Dalfampridine        Dose:  10 mg   Take 10 mg by mouth daily   Refills:  0       Calcium Carb-Cholecalciferol 500-200 MG-UNIT Tabs   Used for:  Closed fracture of femur, unspecified fracture morphology, unspecified laterality, unspecified portion of femur, sequela        Dose:  1 tablet   Take 1 tablet by mouth 2 times daily   Refills:  0       CITALOPRAM HYDROBROMIDE PO        Dose:  20 mg   Take 20 mg by mouth daily   Refills:  0       collagenase ointment        Apply topically every evening   Refills:  0       COUMADIN PO        Refills:  0       enoxaparin 40 MG/0.4ML injection   Commonly known as:  LOVENOX   Used for:  MS (multiple sclerosis) " (H)        Dose:  40 mg   Inject 0.4 mLs (40 mg) Subcutaneous every 24 hours   Refills:  0       GABAPENTIN PO   Indication:  Multiple Sclerosis, Neuropathic Pain        Dose:  600 mg   Take 600 mg by mouth 2 times daily   Refills:  0       ipratropium - albuterol 0.5 mg/2.5 mg/3 mL 0.5-2.5 (3) MG/3ML neb solution   Commonly known as:  DUONEB        Dose:  1 vial   Take 1 vial by nebulization 4 times daily   Refills:  0       IMELDA Pack        Dose:  1 packet   Take 1 packet by mouth 2 times daily   Quantity:  60 packet   Refills:  1       LASIX PO   Used for:  Edema, unspecified type        Dose:  30 mg   Take 30 mg by mouth daily   Refills:  0       lidocaine 2 % topical gel   Commonly known as:  XYLOCAINE        Apply topically every evening apply daily 50/50 with santyl   Refills:  0       metoprolol 25 MG tablet   Commonly known as:  LOPRESSOR   Used for:  Benign essential hypertension        Dose:  25 mg   Take 1 tablet (25 mg) by mouth 2 times daily   Quantity:  60 tablet   Refills:  0       miconazole 2 % powder   Commonly known as:  MICATIN; MICRO GUARD   Used for:  Dermatitis        Apply topically every hour as needed for other (topical candidiasis)   Refills:  0       MULTIVITAL PO   Used for:  Closed fracture of femur, unspecified fracture morphology, unspecified laterality, unspecified portion of femur, sequela        Dose:  1 tablet   Take 1 tablet by mouth every morning   Refills:  0       oxyCODONE 5 MG IR tablet   Commonly known as:  ROXICODONE   Used for:  Chronic pain of both lower extremities        Dose:  5 mg   Take 1 tablet (5 mg) by mouth every 6 hours And every 6 hrs PRN   Quantity:  30 tablet   Refills:  0       polyethylene glycol powder   Commonly known as:  MIRALAX/GLYCOLAX   Indication:  Constipation        Dose:  17 g   Take 17 g by mouth 2 times daily   Refills:  0            Where to get your medicines      Some of these will need a paper prescription and others can be bought over  the counter. Ask your nurse if you have questions.     Bring a paper prescription for each of these medications     ciprofloxacin 500 MG tablet                Protect others around you: Learn how to safely use, store and throw away your medicines at www.disposemymeds.org.             Medication List: This is a list of all your medications and when to take them. Check marks below indicate your daily home schedule. Keep this list as a reference.      Medications           Morning Afternoon Evening Bedtime As Needed    acetaminophen 325 MG tablet   Commonly known as:  TYLENOL   Take 1,000 mg by mouth 3 times daily                                AMPYRA 10 MG Tb12   Take 10 mg by mouth daily   Generic drug:  Dalfampridine                                Calcium Carb-Cholecalciferol 500-200 MG-UNIT Tabs   Take 1 tablet by mouth 2 times daily                                ciprofloxacin 500 MG tablet   Commonly known as:  CIPRO   Take 1 tablet (500 mg) by mouth 2 times daily                                CITALOPRAM HYDROBROMIDE PO   Take 20 mg by mouth daily                                collagenase ointment   Apply topically every evening                                COUMADIN PO                                enoxaparin 40 MG/0.4ML injection   Commonly known as:  LOVENOX   Inject 0.4 mLs (40 mg) Subcutaneous every 24 hours                                GABAPENTIN PO   Take 600 mg by mouth 2 times daily                                ipratropium - albuterol 0.5 mg/2.5 mg/3 mL 0.5-2.5 (3) MG/3ML neb solution   Commonly known as:  DUONEB   Take 1 vial by nebulization 4 times daily                                IMELDA Pack   Take 1 packet by mouth 2 times daily                                LASIX PO   Take 30 mg by mouth daily                                lidocaine 2 % topical gel   Commonly known as:  XYLOCAINE   Apply topically every evening apply daily 50/50 with santyl                                metoprolol 25  MG tablet   Commonly known as:  LOPRESSOR   Take 1 tablet (25 mg) by mouth 2 times daily                                miconazole 2 % powder   Commonly known as:  MICATIN; MICRO GUARD   Apply topically every hour as needed for other (topical candidiasis)                                MULTIVITAL PO   Take 1 tablet by mouth every morning                                oxyCODONE 5 MG IR tablet   Commonly known as:  ROXICODONE   Take 1 tablet (5 mg) by mouth every 6 hours And every 6 hrs PRN                                polyethylene glycol powder   Commonly known as:  MIRALAX/GLYCOLAX   Take 17 g by mouth 2 times daily

## 2017-03-08 NOTE — ANESTHESIA POSTPROCEDURE EVALUATION
Patient: Adwoa Peralta    Procedure(s):   CYSTOSCOPY, RIGHT DIAGNOSTIC URETEROSCOPY,  RIGHT STENT REMOVAL - Wound Class: II-Clean Contaminated   - Wound Class: II-Clean Contaminated    Diagnosis:RIGHT URETERAL STONE   Diagnosis Additional Information: No value filed.    Anesthesia Type:  General, LMA    Note:  Anesthesia Post Evaluation    Patient location during evaluation: PACU  Patient participation: Able to fully participate in evaluation  Level of consciousness: awake  Pain management: adequate  Airway patency: patent  Cardiovascular status: acceptable  Respiratory status: acceptable and nasal cannula  Hydration status: acceptable  PONV: none     Anesthetic complications: None          Last vitals:  Vitals:    03/08/17 1444 03/08/17 1500 03/08/17 1545   BP:  93/59 100/51   Resp: 20 14 14   Temp:      SpO2: 92% 96% 99%         Electronically Signed By: Marcell Sampson MD  March 8, 2017  5:19 PM

## 2017-03-08 NOTE — H&P (VIEW-ONLY)
[facility]  Nursing Home Regulatory Visit  March 7, 2017    Chief Complaint   Patient presents with     shelter Regulatory       HPI:    Adwoa Peralta is a 76 year old  (1940), who is being seen today for a federally mandated E/M visit at [facility]. 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 Had definitive treatment with XRT (completed late January) with plan for repeat CT chest in April.   2) Stress Induced Cardiomyopathy (EF 35-40%) -- diagnosed in January in setting of sepsis. Continues on furosemide 30 mg daily and metoprolol 25 mg BID. Pending goals of care, would defer repeat TTE if focus shifts more towards comfort.   3) Right Ureteral Stones s/p R Ureteral Stent (12/27/16) -- is scheduled for a cysto, lithotripsy and stent placement with Dr. Hawthorne on 3/8. Pre-op completed on 2/13 and is in [facility]. No contraindications seen today on my visit.     ALLERGIES: Dilaudid [hydromorphone] and Questran [cholestyramine]    PAST MEDICAL HISTORY:   Past Medical History   Diagnosis Date     Cancer (H)      Diabetes (H)      new dx 8/2016     History of blood transfusion      Hypertension      Lung nodule      Multiple sclerosis (H)      Neuromuscular disorder (H)      Vitamin D deficiency        PAST SURGICAL HISTORY:   Past Surgical History   Procedure Laterality Date     Gyn surgery       Decorticate lung  7/1/2014     Procedure: DECORTICATE LUNG;  Surgeon: Eyal Echols MD;  Location:  OR     Thoracotomy  7/1/2014     Procedure: THORACOTOMY;  Surgeon: Eyal Echols MD;  Location:  OR     Lobectomy lung  7/1/2014     Procedure: LOBECTOMY LUNG;  Surgeon: Eyal Echols MD;  Location:  OR     Cystoscopy, retrogrades, insert stent ureter(s), combined Right 12/27/2016     Procedure: COMBINED  CYSTOSCOPY, RETROGRADES, INSERT STENT URETER(S);  Surgeon: Quinn Hawthorne MD;  Location:  OR     Combined cystoscopy, insert stent ureter(s) Right 12/27/2016     Procedure: COMBINED CYSTOSCOPY, INSERT STENT URETER(S);  Surgeon: Quinn Hawthorne MD;  Location:  OR       FAMILY HISTORY:   No family history on file.    SOCIAL HISTORY:   Lives in a SNF    MEDICATIONS:  Current Outpatient Prescriptions   Medication Sig Dispense Refill     CITALOPRAM HYDROBROMIDE PO Take 20 mg by mouth daily       collagenase ointment Apply topically every evening       lidocaine (XYLOCAINE) 2 % topical gel Apply topically every evening apply daily 50/50 with santyl       oxyCODONE (ROXICODONE) 5 MG IR tablet Take 1 tablet (5 mg) by mouth every 6 hours And every 6 hrs PRN 30 tablet 0     Nutritional Supplements (IMELDA) PACK Take 1 packet by mouth 2 times daily 60 packet 1     ipratropium - albuterol 0.5 mg/2.5 mg/3 mL (DUONEB) 0.5-2.5 (3) MG/3ML neb solution Take 1 vial by nebulization 4 times daily       Furosemide (LASIX PO) Take 30 mg by mouth daily       enoxaparin (LOVENOX) 40 MG/0.4ML injection Inject 0.4 mLs (40 mg) Subcutaneous every 24 hours       metoprolol (LOPRESSOR) 25 MG tablet Take 1 tablet (25 mg) by mouth 2 times daily 60 tablet      miconazole (MICATIN; MICRO GUARD) 2 % powder Apply topically every hour as needed for other (topical candidiasis)       GABAPENTIN PO Take 600 mg by mouth 2 times daily       Multiple Vitamins-Minerals (MULTIVITAL PO) Take 1 tablet by mouth every morning       Calcium Carb-Cholecalciferol 500-200 MG-UNIT TABS Take 1 tablet by mouth 2 times daily       acetaminophen (TYLENOL) 325 MG tablet Take 1,000 mg by mouth 3 times daily        polyethylene glycol (MIRALAX/GLYCOLAX) powder Take 17 g by mouth 2 times daily        Dalfampridine (AMPYRA) 10 MG TB12 Take 10 mg by mouth daily          Medications reviewed:  Medications reconciled to facility chart and changes were made to  "reflect current medications as identified as above med list. Below are the changes that were made:   Medications stopped since last EPIC medication reconciliation:   There are no discontinued medications.  Medications started since last [facility] medication reconciliation:  No orders of the defined types were placed in this encounter.    Case Management:  I have reviewed the care plan and MDS and do agree with the plan.   Information reviewed:  Medications, vital signs, orders, and nursing notes.    ROS:  Unable to be obtained due to cognitive impairment or aphasia.     PHYSICAL EXAM:  /70  Pulse 80  Temp 97.7  F (36.5  C)  Resp 16  Ht 5' 6\" (1.676 m)  Wt 160 lb 9.6 oz (72.8 kg)  SpO2 96%  BMI 25.92 kg/m2  Gen: sitting in wheelchair, alert and in no acute distress  HEENT: normocephalic; oropharynx clear  Card: RRR, S1, S2, no murmurs  Resp: lungs with a couple of scattered crackles that cleared with inspiration, no wheezing and overall clear to auscultation bilaterally  GI: abdomen soft, not-tender  Ext: bilateral 2+ LE edema  Neuro: CX II-XII grossly in tact; ROM in upper extremities grossly in tact  Psych: memory, judgement and insight impaired    Lab/Diagnostic data:  Reviewed as per Epic    ASSESSMENT/PLAN    1) Left Lower Lobe Non-Small Cell Lung Cancer   Overall has had a clinical decline, including weight loss and now pressure ulcers on bilateral lower extremities. Had definitive treatment with XRT (completed late January) with plan for repeat CT chest in April.   -- PCP has talked with Nancy re: goals of care  -- based on my interaction today, Adwoa does not have capacity at this time to make goals of care decisions  -- given overall decline, I would support a more comfort focused care plan    2) Stress Induced Cardiomyopathy (EF 35-40%)   Diagnosed in January in setting of sepsis.   -- continues on furosemide 30 mg daily and metoprolol 25 mg BID  -- follow BPs, clinical volume status/weights  -- " pending goals of care, would defer repeat TTE if focus shifts more towards comfort.     3) Right Ureteral Stones s/p R Ureteral Stent (12/27/16)  She is scheduled for a cysto, lithotripsy and stent placement with Dr. Hawthorne on 3/8. Pre-op completed on 2/13 and is in Epic. Based on my exam today, no contraindications for proposed procedure tomorrow. Facility has been communicating with her son as he needs to be present to sign forms.   -- ongoing management per urology       Electronically signed by:  Natalya Manjarrez MD

## 2017-03-08 NOTE — BRIEF OP NOTE
[facility] Brief Operative Note    Pre-operative diagnosis: RIGHT URETERAL STONE    Post-operative diagnosis Stone has passed   Procedure: Procedure(s):   CYSTOSCOPY, RIGHT DIAGNOSTIC URETEROSCOPY,  RIGHT STENT REMOVAL - Wound Class: II-Clean Contaminated   - Wound Class: II-Clean Contaminated   Surgeon(s): Surgeon(s) and Role:     * Quinn Hawthorne MD - Primary   Estimated blood loss: * No values recorded between 3/8/2017  1:43 PM and 3/8/2017  2:13 PM *    Specimens: none   Findings: Stone had passed or come out with the stent

## 2017-03-08 NOTE — OR NURSING
Medications reviewed with nursing home nurse. History reviewed with inaccurate information from patient and son giving answers and using patients nursing home chart.

## 2017-03-08 NOTE — OR NURSING
Posterior inner thighs bilaterally have multiple skin tears, white cream on top. Bilateral feet and legs wrapped up to mid shin areas with dressings, clean and dry. Left intact. Report called to [facility] reguarding dressings.

## 2017-03-08 NOTE — PLAN OF CARE
Call to patients son with update and care plan, left message on his voice mail with call back number if questions.

## 2017-03-08 NOTE — DISCHARGE INSTRUCTIONS
Same Day Surgery Discharge Instructions for  Sedation and General Anesthesia       It's not unusual to feel dizzy, light-headed or faint for up to 24 hours after surgery or while taking pain medication.  If you have these symptoms: sit for a few minutes before standing and have someone assist you when you get up to walk or use the bathroom.      You should rest and relax for the next 24 hours. We recommend you make arrangements to have an adult stay with you for at least 24 hours after your discharge.  Avoid hazardous and strenuous activity.      DO NOT DRIVE any vehicle or operate mechanical equipment for 24 hours following the end of your surgery.  Even though you may feel normal, your reactions may be affected by the medication you have received.      Do not drink alcoholic beverages for 24 hours following surgery.       Slowly progress to your regular diet as you feel able. It's not unusual to feel nauseated and/or vomit after receiving anesthesia.  If you develop these symptoms, drink clear liquids (apple juice, ginger ale, broth, 7-up, etc. ) until you feel better.  If your nausea and vomiting persists for 24 hours, please notify your surgeon.        All narcotic pain medications, along with inactivity and anesthesia, can cause constipation. Drinking plenty of liquids and increasing fiber intake will help.      For any questions of a medical nature, call your surgeon.      Do not make important decisions for 24 hours.      If you had general anesthesia, you may have a sore throat for a couple of days related to the breathing tube used during surgery.  You may use Cepacol lozenges to help with this discomfort.  If it worsens or if you develop a fever, contact your surgeon.       If you feel your pain is not well managed with the pain medications prescribed by your surgeon, please contact your surgeon's office to let them know so they can address your concerns.       **If you have questions or concerns about  your procedure,   call Dr. Hawthorne at [phone]**    Reasons to contact your surgeon:    1. Signs of possible infection: Check your incision daily for redness, swelling, warmth, red streaks or foul drainage.   2. Elevated temperature.  3. Pain not controlled with pain medication and/or rest.   4. Uncontrolled nausea or vomiting.  5. Any questions or concerns.      Cystoscopy Discharge Instructions      Diet:    Return to the diet that you were on before the procedure, unless you are given specific diet instructions.    It is important to drink 6-8 glasses of fluids per day at home - at least 3-4 glasses should be water.    Activity:    Walk short distances and increase as your strength allows.    You may climb stairs.    Do not do strenuous exercise or heavy lifting until approved by surgeon.    Do not drive while taking narcotic pain medications.    Bathing:    You may take a shower.    Call your physician if these signs/symptoms are present:    Pain that is not relieved by a short rest or ordered pain medications.    Temperature at or above 101.0 F or chills.    Inability or difficulty urinating.  Excessive blood in urine.    Any questions or concerns.

## 2017-03-14 NOTE — PROGRESS NOTES
[facility]    Chief Complaint   Patient presents with     RECHECK       HPI:    Adwoa Peralta is a 76 year old  (1940), who is being seen today for an episodic care visit at [facility]. Today's concern is:    MS (multiple sclerosis) (H)  Acute deep vein thrombosis (DVT) of tibial vein of left lower extremity (H)  Long term (current) use of anticoagulants  Patient with long hx of MS, debility, previous bilateral femur fractures and known DVT. Initially started on Coumadin, then changed to Lovenox during hospitalization. In view of decreased mobility and spending most time in bed, as well as known DVT she will need to remain on anticoagulation at this time. Would like to attempt switching back to Coumadin if possible and stop Lovenox when therapeutic.     Non-small cell cancer of left lung (H)  Loss of weight  Chronic issue. Due to f/u with radiation oncology in April to repeat imaging and discuss further treatment options. NP had a long discussion with patient's son Wilmer Christianson over the phone. Son aware that patient is declining and is now DNR/DNI. We discussed hospice - son feels this may be a good choice but would like to wait until f/u with radiation oncology in April to further discuss prognosis and options. Will consider hospice at that time. Son is aware of significant weight loss - down 17 lbs in the past month. WEIGHT: 2/14/17 174.6 LBS AND 3/11/17 157.2 LBS and BP: /46-83.  Recent BG: A.M.: 103-147, NOON: 132-194, P.M.: 139-251.     Decubitus ulcers, unstageable (H)  Ulcers on both lower legs. Per most recent wound specialist's notes/facility charting:  --Left heel pressure ulcer with length = 7.0, Width = 2.5 cm. No drainage  --Right heel pressure injury with length = 1.2, Width = 2.0 cm. No drainage  --bilateral pressure ulcers on posterior lower legs s/p sharps debridement. Wound base each wound with slough and bloody drainage.   Currently being treated with  adaptic, gauze and changed daily. Sees in house wound team weekly.     Edema, unspecified type  Resolved edema from our last visit and also significant weight loss noted. LS clear lower fields, some upper airway ronchi.     Functional urinary incontinence  Ureteral stone  S/P Lugo placement  Patient has now passes a ureteral stone. Lugo was placed per urology for patient's comfort. Today Adwoa reports she would like to leave Lugo in for now. She has significant pain and having a Lugo is more comfortable due to requiring less frequent changing. She has no pain associated with Lugo. Will monitor for now and change catheter every 30 days.       ALLERGIES: Dilaudid [hydromorphone] and Questran [cholestyramine]  Past Medical, Surgical, Family and Social History reviewed and updated in Collect[facility].    Current Outpatient Prescriptions   Medication Sig Dispense Refill     CITALOPRAM HYDROBROMIDE PO Take 20 mg by mouth daily       collagenase ointment Apply topically every evening       lidocaine (XYLOCAINE) 2 % topical gel Apply topically every evening apply daily 50/50 with santyl       oxyCODONE (ROXICODONE) 5 MG IR tablet Take 1 tablet (5 mg) by mouth every 6 hours And every 6 hrs PRN 30 tablet 0     Nutritional Supplements (IMELDA) PACK Take 1 packet by mouth 2 times daily 60 packet 1     ipratropium - albuterol 0.5 mg/2.5 mg/3 mL (DUONEB) 0.5-2.5 (3) MG/3ML neb solution Take 1 vial by nebulization 4 times daily       Furosemide (LASIX PO) Take 30 mg by mouth daily       enoxaparin (LOVENOX) 40 MG/0.4ML injection Inject 0.4 mLs (40 mg) Subcutaneous every 24 hours       metoprolol (LOPRESSOR) 25 MG tablet Take 1 tablet (25 mg) by mouth 2 times daily 60 tablet      miconazole (MICATIN; MICRO GUARD) 2 % powder Apply topically every hour as needed for other (topical candidiasis)       GABAPENTIN PO Take 600 mg by mouth 2 times daily       Multiple Vitamins-Minerals (MULTIVITAL PO) Take 1 tablet by mouth every morning        "Calcium Carb-Cholecalciferol 500-200 MG-UNIT TABS Take 1 tablet by mouth 2 times daily       acetaminophen (TYLENOL) 325 MG tablet Take 1,000 mg by mouth 3 times daily        polyethylene glycol (MIRALAX/GLYCOLAX) powder Take 17 g by mouth 2 times daily        Dalfampridine (AMPYRA) 10 MG TB12 Take 10 mg by mouth daily        Warfarin Sodium (COUMADIN PO)        Medications reviewed:  Medications reconciled to facility chart and changes were made to reflect current medications as identified as above med list. Below are the changes that were made:   Medications stopped since last EPIC medication reconciliation:   Medications Discontinued During This Encounter   Medication Reason     ciprofloxacin (CIPRO) 500 MG tablet Medication Reconciliation Clean Up       Medications started since last [facility] medication reconciliation:  No orders of the defined types were placed in this encounter.    REVIEW OF SYSTEMS:  No chest pain, shortness of breath, fevers, chills, headache, nausea, vomiting or bowel abnormalities.  Appetite is  fair.  Chronic moderate to severe pain, CONSTITUTIONAL:  weight loss and forgetfulness, CV:  dyspnea on exertion and resolved LE edema, RESPIRATORY: dyspnea on exertion, :  Lugo in place due to functional incontinence, NEURO:  memory loss and weakness due to MS, MUSCULOSKELETAL: generalized extremity pain, ENDO: elevated BG with dx DM and SKIN: wound    Physical Exam:  BP 95/55  Pulse 94  Temp 97  F (36.1  C)  Resp 20  Ht 5' 6\" (1.676 m)  Wt 157 lb 3.2 oz (71.3 kg)  SpO2 100%  BMI 25.37 kg/m2  GENERAL APPEARANCE:  Alert, in no distress, pleasant, cooperative, oriented x self and place. Forgetful.   EYES:  EOM, lids, pupils and irises normal, sclera clear and conjunctiva normal, no discharge or mattering on lids or lashes noted  ENT:  Mouth normal, moist mucous membranes, nose normal without drainage or crusting, external ears without lesions, hearing acuity intact  RESP:  respiratory effort and " palpation of chest normal, no chest wall tenderness, no respiratory distress, Lung sounds clear but diminished bases, patient is on oxygen  CV:  Palpation and auscultation of heart done, rate and rhythm controlled and regular, no murmur, no rub or gallop. Edema none pitting bilateral lower extremities. VASCULAR: pressure ulcers each lower leg (posterior) and deep tissue injuries each heel  ABDOMEN:  normal bowel sounds, soft, nontender, no grimacing or guarding with palpation, no hepatosplenomegaly or other masses  M/S:   Gait and station bedbound and wheelchair bound, Digits and nails normal, unable to move legs due to MS  SKIN:  Inspection and palpation of skin and subcutaneous tissue: skin on extremities with ulcers each heel and each posterior lower leg  NEURO: cranial nerves 2-12 grossly intact, no facial asymmetry, no speech deficits and able to follow directions  PSYCH:  insight and judgement impaired, memory impaired, affect and mood normal     Recent Labs:      CBC RESULTS:   Recent Labs   Lab Test 02/14/17 02/06/17   WBC  7.8  10.2   RBC  3.23*  3.06*   HGB  10.3*  9.6*   HCT  33.6*  31.4*   MCV  104*  103*   MCH  32  31   MCHC  31*  31*   RDW  19.8*  20.2*   PLT  354  388       Last Basic Metabolic Panel:  Recent Labs   Lab Test  03/08/17   1231 02/14/17 02/03/17   NA   --   142  138   POTASSIUM  4.3  3.8  4.0   CHLORIDE   --   103  102   CAITLYN   --   9.2  8.9   CO2   --   31  26   BUN   --   25*  16   CR   --   0.70  0.66   GLC   --   97  121*     TSH   Date Value Ref Range Status   05/04/2016 1.67 mcU/mL Final       Lab Results   Component Value Date    A1C 5.0 02/06/2017    A1C 6.3 11/01/2016       Assessment/Plan:  MS (multiple sclerosis) (H)  Acute deep vein thrombosis (DVT) of tibial vein of left lower extremity (H)  Long term (current) use of anticoagulants  Chronic issues, on Lovenox. Will initiate Coumadin at 2.5 mg PO daily starting dose.     Non-small cell cancer of left lung (H)  Loss of  weight  Chronic issues, patient is failing. Family would like to see radiation oncology for follow up, then consider further goals of care/hospice referral if appropriate.     Decubitus ulcers, unstageable (H)  Ongoing issues, related to immobility, cancer dx. Sees wound team in house - f/u with progress at next routine visit.     Edema, unspecified type  Resolved. Will decrease Lasix and f/u PRN.     Functional urinary incontinence  Ureteral stone  Chronic issue, stone now passed. Lugo in place per urology for comfort- patient requests to leave in place. Monitor for now.     Orders:  1. Change Lugo cath every 30 days dx functional urinary incontinence  2. Decrease Lasix to 20 mg PO QOD dx edema  3. Coumadin 2.5 mg PO on 3/14 and 3/15 and check INR on 3/16 Dx DVT  4. Continue Lovenox daily - stop once INR at or above 2  5. Multivitamin 1 tab PO daily - make sure it has Vit K in it. Dx Nutritional supplement    Total time spent with patient visit was 35 min including patient visit, review of past records, phone call to patient contact and discussion of patient status and POC with staff. Greater than 50% of total time spent with counseling and coordinating care.     Electronically signed by  RE Kaufman CNP

## 2017-03-14 NOTE — MR AVS SNAPSHOT
After Visit Summary   3/14/2017    Adwoa Peralta    MRN: [id number]           Patient Information     Date Of Birth          1940        Visit Information        Provider Department      3/14/2017 1:30 PM Jackelyn Dixon APRN CNP [facility]        Today's Diagnoses     MS (multiple sclerosis) (H)    -  1    Acute deep vein thrombosis (DVT) of tibial vein of left lower extremity (H)        Long term (current) use of anticoagulants        Non-small cell cancer of left lung (H)        Loss of weight        Decubitus ulcers, unstageable (H)        Edema, unspecified type        Functional urinary incontinence        Ureteral stone           Follow-ups after your visit        Your next 10 appointments already scheduled     Mar 31, 2017 10:40 AM CDT   CT CHEST W/O CONTRAST with UUCT1   [place] ([facility])    [address] [id number] [phone]           Please bring any scans or X-rays taken at other hospitals, if similar tests were done. Also bring a list of your medicines, including vitamins, minerals and over-the-counter drugs. It is safest to leave personal items at home.  Be sure to tell your doctor:   If you have any allergies.   If there s any chance you are pregnant.   If you are breastfeeding.   If you have any special needs.  You do not need to do anything special to prepare.  Please wear loose clothing, such as a sweat suit or jogging clothes. Avoid snaps, zippers and other metal. We may ask you to undress and put on a hospital gown.            Apr 05, 2017 10:00 AM CDT   Return Visit with Iggy Dubose MD   Radiation Oncology Clinic ([facility])    [facility]  1st Floor  [address] [id number] [phone]              Who to contact     If you have questions or need follow up information about today's clinic visit or  "your schedule please contact [facility] directly at [phone].  Normal or non-critical lab and imaging results will be communicated to you by MyChart, letter or phone within 4 business days after the clinic has received the results. If you do not hear from us within 7 days, please contact the clinic through Client Outlookhart or phone. If you have a critical or abnormal lab result, we will notify you by phone as soon as possible.  Submit refill requests through PasswordBox or call your pharmacy and they will forward the refill request to us. Please allow 3 business days for your refill to be completed.          Additional Information About Your Visit        Client OutlookharPublicfast Information     PasswordBox lets you send messages to your doctor, view your test results, renew your prescriptions, schedule appointments and more. To sign up, go to [email or web address] . Click on \"Log in\" on the left side of the screen, which will take you to the Welcome page. Then click on \"Sign up Now\" on the right side of the page.     You will be asked to enter the access code listed below, as well as some personal information. Please follow the directions to create your username and password.     Your access code is: [email or web address]  Expires: 4/3/2017  9:42 AM     Your access code will  in 90 days. If you need help or a new code, please call your [place] clinic or [phone].        Care EveryWhere ID     This is your Care EveryWhere ID. This could be used by other organizations to access your [place] medical records  [id number]        Your Vitals Were     Pulse Temperature Respirations Height Pulse Oximetry BMI (Body Mass Index)    94 97  F (36.1  C) 20 5' 6\" (1.676 m) 100% 25.37 kg/m2       Blood Pressure from Last 3 Encounters:   17 95/55   17 100/51   17 138/70    Weight from Last 3 Encounters:   17 157 lb 3.2 oz (71.3 kg)   17 160 lb (72.6 kg)   17 160 lb 9.6 oz (72.8 kg)              Today, " you had the following     No orders found for display       Primary Care Provider    None Specified       No primary provider on file.        Thank you!     Thank you for choosing [facility]  for your care. Our goal is always to provide you with excellent care. Hearing back from our patients is one way we can continue to improve our services. Please take a few minutes to complete the written survey that you may receive in the mail after your visit with us. Thank you!             Your Updated Medication List - Protect others around you: Learn how to safely use, store and throw away your medicines at www.disposemymeds.org.          This list is accurate as of: 3/14/17  1:31 PM.  Always use your most recent med list.                   Brand Name Dispense Instructions for use    acetaminophen 325 MG tablet    TYLENOL     Take 1,000 mg by mouth 3 times daily       AMPYRA 10 MG Tb12   Generic drug:  Dalfampridine      Take 10 mg by mouth daily       Calcium Carb-Cholecalciferol 500-200 MG-UNIT Tabs      Take 1 tablet by mouth 2 times daily       CITALOPRAM HYDROBROMIDE PO      Take 20 mg by mouth daily       collagenase ointment      Apply topically every evening       COUMADIN PO          enoxaparin 40 MG/0.4ML injection    LOVENOX     Inject 0.4 mLs (40 mg) Subcutaneous every 24 hours       GABAPENTIN PO      Take 600 mg by mouth 2 times daily       ipratropium - albuterol 0.5 mg/2.5 mg/3 mL 0.5-2.5 (3) MG/3ML neb solution    DUONEB     Take 1 vial by nebulization 4 times daily       IMELDA Pack     60 packet    Take 1 packet by mouth 2 times daily       LASIX PO      Take 30 mg by mouth daily       lidocaine 2 % topical gel    XYLOCAINE     Apply topically every evening apply daily 50/50 with santyl       metoprolol 25 MG tablet    LOPRESSOR    60 tablet    Take 1 tablet (25 mg) by mouth 2 times daily       miconazole 2 % powder    MICATIN; MICRO GUARD     Apply topically every hour as needed for other  (topical candidiasis)       MULTIVITAL PO      Take 1 tablet by mouth every morning       oxyCODONE 5 MG IR tablet    ROXICODONE    30 tablet    Take 1 tablet (5 mg) by mouth every 6 hours And every 6 hrs PRN       polyethylene glycol powder    MIRALAX/GLYCOLAX     Take 17 g by mouth 2 times daily

## 2017-03-17 NOTE — TELEPHONE ENCOUNTER
Called by nursing staff at [facility] about INR results today :  1.4    Patient has had warfarin 2.5 mg past 2 days, just restarted after surgery, per nurse.     PLAN:  Increase warfarin to 3 mg/day  Recheck INR Monday 3/20  Jennifer Chu MD

## 2017-03-20 NOTE — MR AVS SNAPSHOT
After Visit Summary   3/20/2017    Adwoa Peralta    MRN: [id number]           Patient Information     Date Of Birth          1940        Visit Information        Provider Department      3/20/2017 12:00 PM Jackelyn Dixon APRN CNP [facility]        Today's Diagnoses     Acute deep vein thrombosis (DVT) of tibial vein of left lower extremity (H)    -  1    Long term (current) use of anticoagulants        Encounter for therapeutic drug monitoring        Chronic pain of both lower extremities        Non-small cell cancer of left lung (H)        Type 2 diabetes mellitus with diabetic neuropathy, without long-term current use of insulin (H)        Bilateral leg edema           Follow-ups after your visit        Your next 10 appointments already scheduled     Mar 31, 2017 10:40 AM CDT   CT CHEST W/O CONTRAST with UUCT1   [place], [place] ([facility], [facility])    [address] [address] [phone]           Please bring any scans or X-rays taken at other hospitals, if similar tests were done. Also bring a list of your medicines, including vitamins, minerals and over-the-counter drugs. It is safest to leave personal items at home.  Be sure to tell your doctor:   If you have any allergies.   If there s any chance you are pregnant.   If you are breastfeeding.   If you have any special needs.  You do not need to do anything special to prepare.  Please wear loose clothing, such as a sweat suit or jogging clothes. Avoid snaps, zippers and other metal. We may ask you to undress and put on a hospital gown.            Apr 05, 2017 10:00 AM CDT   Return Visit with Iggy Dubose MD   Radiation Oncology Clinic ([facility])    [facility]  1st Floor  [address] [id number] [phone]              Who to contact     If you have questions or need follow up  "information about today's clinic visit or your schedule please contact [facility] directly at [phone].  Normal or non-critical lab and imaging results will be communicated to you by Generohart, letter or phone within 4 business days after the clinic has received the results. If you do not hear from us within 7 days, please contact the clinic through Generohart or phone. If you have a critical or abnormal lab result, we will notify you by phone as soon as possible.  Submit refill requests through Senzari or call your pharmacy and they will forward the refill request to us. Please allow 3 business days for your refill to be completed.          Additional Information About Your Visit        MyChart Information     Senzari lets you send messages to your doctor, view your test results, renew your prescriptions, schedule appointments and more. To sign up, go to [email or web address] . Click on \"Log in\" on the left side of the screen, which will take you to the Welcome page. Then click on \"Sign up Now\" on the right side of the page.     You will be asked to enter the access code listed below, as well as some personal information. Please follow the directions to create your username and password.     Your access code is: [email or web address]  Expires: 4/3/2017  9:42 AM     Your access code will  in 90 days. If you need help or a new code, please call your [facility] clinic or [phone].        Care EveryWhere ID     This is your Care EveryWhere ID. This could be used by other organizations to access your [facility] medical records  [id number]        Your Vitals Were     Pulse Temperature Respirations Height Pulse Oximetry BMI (Body Mass Index)    70 97  F (36.1  C) 18 5' 6\" (1.676 m) 98% 25.37 kg/m2       Blood Pressure from Last 3 Encounters:   17 122/67   17 95/55   17 100/51    Weight from Last 3 Encounters:   17 157 lb 3.2 oz (71.3 kg)   17 157 lb 3.2 oz (71.3 kg) "   03/08/17 160 lb (72.6 kg)              Today, you had the following     No orders found for display       Primary Care Provider    None Specified       No primary provider on file.        Thank you!     Thank you for choosing [facility]  for your care. Our goal is always to provide you with excellent care. Hearing back from our patients is one way we can continue to improve our services. Please take a few minutes to complete the written survey that you may receive in the mail after your visit with us. Thank you!             Your Updated Medication List - Protect others around you: Learn how to safely use, store and throw away your medicines at www.disposemymeds.org.          This list is accurate as of: 3/20/17 11:59 PM.  Always use your most recent med list.                   Brand Name Dispense Instructions for use    acetaminophen 325 MG tablet    TYLENOL     Take 1,000 mg by mouth 3 times daily       AMPYRA 10 MG Tb12   Generic drug:  Dalfampridine      Take 10 mg by mouth daily       Calcium Carb-Cholecalciferol 500-200 MG-UNIT Tabs      Take 1 tablet by mouth 2 times daily       CITALOPRAM HYDROBROMIDE PO      Take 20 mg by mouth daily       collagenase ointment      Apply topically every evening       COUMADIN PO          enoxaparin 40 MG/0.4ML injection    LOVENOX     Inject 0.4 mLs (40 mg) Subcutaneous every 24 hours       GABAPENTIN PO      Take 600 mg by mouth 2 times daily       ipratropium - albuterol 0.5 mg/2.5 mg/3 mL 0.5-2.5 (3) MG/3ML neb solution    DUONEB     Take 1 vial by nebulization 4 times daily       IMELDA Pack     60 packet    Take 1 packet by mouth 2 times daily       LASIX PO      Take 20 mg by mouth every other day       lidocaine 2 % topical gel    XYLOCAINE     Apply topically every evening apply daily 50/50 with santyl       metoprolol 25 MG tablet    LOPRESSOR    60 tablet    Take 1 tablet (25 mg) by mouth 2 times daily       miconazole 2 % powder    MICATIN; MICRO  GUARD     Apply topically every hour as needed for other (topical candidiasis)       MULTIVITAL PO      Take 1 tablet by mouth every morning       oxyCODONE 5 MG IR tablet    ROXICODONE    30 tablet    Take 1 tablet (5 mg) by mouth every 6 hours And every 6 hrs PRN       polyethylene glycol powder    MIRALAX/GLYCOLAX     Take 17 g by mouth 2 times daily

## 2017-03-20 NOTE — ADDENDUM NOTE
Addendum  created 03/20/17 0927 by Marcell Sampson MD    Anesthesia Attestations filed, Anesthesia Intra SmartForms edited

## 2017-03-20 NOTE — PROGRESS NOTES
[facility]    Chief Complaint   Patient presents with     RECHECK       HPI:    Adwoa Peralta is a 76 year old  (1940), who is being seen today for an episodic care visit at [facility]. Today's concern is:    Acute deep vein thrombosis (DVT) of tibial vein of left lower extremity (H)  Long term (current) use of anticoagulants  Encounter for therapeutic drug monitoring  Patient with known DVT and has been on Lovenox since most recent hospital discharge in January. Due to concerns for comfort attempting to switch patient back to Coumadin for management.   Lab Results   Component Value Date    INR 1.4 03/16/2017    INR 1.04 03/08/2017   Patient has taken Coumadin 2.5 mg PO on 3/14 and 3/15, then 3 mg PO daily on 3/16, 3/17, 3/18, 3/19 for a total of 17 mg PO in the past six days. No s/s bleeding or bruising noted.      Chronic pain of both lower extremities  Non-small cell cancer of left lung (H)  Patient with known cancer of lung and chronic pain. Appears confused, more so today than last week. Due to see Dr Dubose for follow up on 4/5 - son Bill will accompany patient to discuss further goals of treatment and prognosis.     Type 2 diabetes mellitus with diabetic neuropathy, without long-term current use of insulin (H)  Patient is a diet controlled diabetic. Recent WEIGHT: 3/7/17 164.2 LBS AND 3/11/17 157.2 LBS and BP: / and BG: A.M.: 100-103, P.M.: 112-251.   Lab Results   Component Value Date    A1C 5.0 02/06/2017     Edema both legs  Patient doing well with decreased dose of Lasix; weight not checked since last week.   Wt Readings from Last 2 Encounters:   03/20/17 157 lb 3.2 oz (71.3 kg)   03/14/17 157 lb 3.2 oz (71.3 kg)       ALLERGIES: Dilaudid [hydromorphone] and Questran [cholestyramine]  Past Medical, Surgical, Family and Social History reviewed and updated in EPIC.    Current Outpatient Prescriptions   Medication Sig Dispense Refill     Warfarin Sodium  (COUMADIN PO)        CITALOPRAM HYDROBROMIDE PO Take 20 mg by mouth daily       collagenase ointment Apply topically every evening       lidocaine (XYLOCAINE) 2 % topical gel Apply topically every evening apply daily 50/50 with santyl       oxyCODONE (ROXICODONE) 5 MG IR tablet Take 1 tablet (5 mg) by mouth every 6 hours And every 6 hrs PRN 30 tablet 0     Nutritional Supplements (IMELDA) PACK Take 1 packet by mouth 2 times daily 60 packet 1     ipratropium - albuterol 0.5 mg/2.5 mg/3 mL (DUONEB) 0.5-2.5 (3) MG/3ML neb solution Take 1 vial by nebulization 4 times daily       Furosemide (LASIX PO) Take 20 mg by mouth every other day        enoxaparin (LOVENOX) 40 MG/0.4ML injection Inject 0.4 mLs (40 mg) Subcutaneous every 24 hours       metoprolol (LOPRESSOR) 25 MG tablet Take 1 tablet (25 mg) by mouth 2 times daily 60 tablet      miconazole (MICATIN; MICRO GUARD) 2 % powder Apply topically every hour as needed for other (topical candidiasis)       GABAPENTIN PO Take 600 mg by mouth 2 times daily       Multiple Vitamins-Minerals (MULTIVITAL PO) Take 1 tablet by mouth every morning       Calcium Carb-Cholecalciferol 500-200 MG-UNIT TABS Take 1 tablet by mouth 2 times daily       acetaminophen (TYLENOL) 325 MG tablet Take 1,000 mg by mouth 3 times daily        polyethylene glycol (MIRALAX/GLYCOLAX) powder Take 17 g by mouth 2 times daily        Dalfampridine (AMPYRA) 10 MG TB12 Take 10 mg by mouth daily        Medications reviewed:  Medications reconciled to facility chart and changes were made to reflect current medications as identified as above med list. Below are the changes that were made:   Medications stopped since last EPIC medication reconciliation:   There are no discontinued medications.    Medications started since last [facility] medication reconciliation:  No orders of the defined types were placed in this encounter.    REVIEW OF SYSTEMS:  10 point ROS of systems including Constitutional, Eyes, Respiratory,  "Cardiovascular, Gastroenterology, Genitourinary, Integumentary, Musculoskeletal, Psychiatric were all negative except for pertinent positives noted in my HPI.    Physical Exam:  /67  Pulse 70  Temp 97  F (36.1  C)  Resp 18  Ht 5' 6\" (1.676 m)  Wt 157 lb 3.2 oz (71.3 kg)  SpO2 98%  BMI 25.37 kg/m2  GENERAL APPEARANCE:  Alert, in no distress, pleasant, cooperative, oriented x self and place  ENT:  Mouth normal, moist mucous membranes, nose normal without drainage or crusting, external ears without lesions, hearing acuity intact  RESP:  respiratory effort and palpation of chest normal, no chest wall tenderness, no respiratory distress, Lung sounds clear but diminished bases, patient is on oxygen per NC  CV:  Palpation and auscultation of heart done, rate and rhythm controlled and regular, no murmur, no rub or gallop. Edema none bilateral lower extremities. Lower legs in pressure reducing boots  M/S:   Gait and station bedbound and wheelchair bound, Digits and nails normal, unable to move legs due to MS  SKIN:  Inspection and palpation of skin and subcutaneous tissue: skin on extremities with ulcers each heel and each posterior lower leg covered with dressing and boots  NEURO: cranial nerves 2-12 grossly intact, no facial asymmetry, no speech deficits and able to follow directions  PSYCH:  insight and judgement impaired, memory impaired, affect and mood normal     Recent Labs:    INR results today: 2.5 on 3/20/17    CBC RESULTS:   Recent Labs   Lab Test 02/14/17 02/06/17   WBC  7.8  10.2   RBC  3.23*  3.06*   HGB  10.3*  9.6*   HCT  33.6*  31.4*   MCV  104*  103*   MCH  32  31   MCHC  31*  31*   RDW  19.8*  20.2*   PLT  354  388       Last Basic Metabolic Panel:  Recent Labs   Lab Test  03/08/17   1231 02/14/17 02/03/17   NA   --   142  138   POTASSIUM  4.3  3.8  4.0   CHLORIDE   --   103  102   CAITLYN   --   9.2  8.9   CO2   --   31  26   BUN   --   25*  16   CR   --   0.70  0.66   GLC   --   97  121*       Liver " Function Studies -   Recent Labs   Lab Test 01/06/17 01/04/17   0714  12/24/16   1115   PROTTOTAL   --   5.0*  7.2   ALBUMIN   --   1.2*  1.5*   BILITOTAL   --   0.2  0.3   ALKPHOS   --   101  158*   AST  14  13  30   ALT   --   <6  34       TSH   Date Value Ref Range Status   05/04/2016 1.67 mcU/mL Final       Lab Results   Component Value Date    A1C 5.0 02/06/2017    A1C 6.3 11/01/2016       Assessment/Plan:  Acute deep vein thrombosis (DVT) of tibial vein of left lower extremity (H)  Long term (current) use of anticoagulants  Encounter for therapeutic drug monitoring  Chronic issue. On Coumadin since last week, goal is for INR 2-3. Today INR 2.5 therapeutic.    Chronic pain of both lower extremities  Chronic, stable with current meds. Monitor.     Non-small cell cancer of left lung (H)  Ongoing issue. F/U with oncology/radiation April as planned.     Type 2 diabetes mellitus with diabetic neuropathy, without long-term current use of insulin (H)  Chronic, managed with diet. F/U at next routine visit and PRN.     Bilateral leg edema  Chronic, well managed with Lasix 20 mg PO QOD. F/U as needed.     Orders:  1. Wt today and weekly diagnosis edema, wt loss  2. INR 2.5. Coumadin 2 mg PO daily. Lovenox stopped. Next INR check 3/23.     Total time spent with patient visit was 35 min including patient visit, review of past records and discussion of patient status and POC with staff. Greater than 50% of total time spent with counseling and coordinating care.     Electronically signed by  RE Kaufman CNP

## 2017-03-30 NOTE — PROGRESS NOTES
[facility]    Chief Complaint   Patient presents with     Nursing Home Acute       HPI:    Adwoa Peralta is a 76 year old  (1940), who is being seen today for an episodic care visit at [facility]. Today's concerns are:    Acute deep vein thrombosis (DVT) of tibial vein of left lower extremity (H)  Long term (current) use of anticoagulants  Encounter for therapeutic drug monitoring  MS (multiple sclerosis) (H)  Patient with long hx of MS, debility, previous bilateral femur fractures and known DVT. Initially on Coumadin, then changed to Lovenox during recent hospitalization. Resident restarted on coumadin recently for management of DVT in view of stopping Lovenox and decreasing injections. In light of decreased mobility and spending most time in bed, as well as known DVT she will need to remain on anticoagulation at this time. Takes dalfampidine ER daily for MS. Received 17 mg coumadin in past 7 days. INR drawn today and 2.0 on 3/30/17. Goal range 2-3.   Lab Results   Component Value Date    INR 2.2 03/23/2017       Type 2 diabetes mellitus with diabetic neuropathy, without long-term current use of insulin (H)  Neuropathy (H)  BG: A.M.: 100-102, NOON: 112-171 and P.M.: 166-193.  On no medications for DM2, Diet controlled. Has scheduled gabapentin for neuropathy.   Lab Results   Component Value Date    A1C 5.0 02/06/2017     Non-small cell cancer of left lung (H)  Has oncology appointment on 4/8/17. On supplemental oxygen 2 lpm via NC prn - uses for comfort.  O2 sats have been in the 90s in the past 30 days on room air and O2. DuoNeb scheduled four times daily.    Edema, unspecified type  Lasix decreased to every other day at last visit.  No edema noted today. BP: /  Wt Readings from Last 5 Encounters:   03/30/17 154 lb (69.9 kg)   03/20/17 157 lb 3.2 oz (71.3 kg)   03/14/17 157 lb 3.2 oz (71.3 kg)   03/08/17 160 lb (72.6 kg)   03/05/17 160 lb 9.6 oz (72.8 kg)      Skin breakdown  Is followed by wound care team for BLE wounds including pressure ulcers on bilateral heels. Collagenase and lidocaine ointment used for wound debridement and pain control.     Weight loss  WEIGHT: 1/30/17: 192.8 LBS  3/20/17 154 LBS. ON Glucerna BID. Reports improved appetite.  Body mass index is 24.86 kg/(m^2).  Feel this is expected weight loss d/t lung cancer diagnosis.       ALLERGIES: Dilaudid [hydromorphone] and Questran [cholestyramine]  Past Medical, Surgical, Family and Social History reviewed and updated in [facility].    Current Outpatient Prescriptions   Medication Sig Dispense Refill     Warfarin Sodium (COUMADIN PO) 2mg by mouth daily       CITALOPRAM HYDROBROMIDE PO Take 20 mg by mouth daily       collagenase ointment Apply topically every evening       lidocaine (XYLOCAINE) 2 % topical gel Apply topically every evening apply daily 50/50 with santyl       oxyCODONE (ROXICODONE) 5 MG IR tablet Take 1 tablet (5 mg) by mouth every 6 hours And every 6 hrs PRN 30 tablet 0     Nutritional Supplements (IMELDA) PACK Take 1 packet by mouth 2 times daily 60 packet 1     ipratropium - albuterol 0.5 mg/2.5 mg/3 mL (DUONEB) 0.5-2.5 (3) MG/3ML neb solution Take 1 vial by nebulization 4 times daily       metoprolol (LOPRESSOR) 25 MG tablet Take 1 tablet (25 mg) by mouth 2 times daily 60 tablet      miconazole (MICATIN; MICRO GUARD) 2 % powder Apply topically every hour as needed for other (topical candidiasis)       GABAPENTIN PO Take 600 mg by mouth 2 times daily       Multiple Vitamins-Minerals (MULTIVITAL PO) Take 1 tablet by mouth every morning       Calcium Carb-Cholecalciferol 500-200 MG-UNIT TABS Take 1 tablet by mouth 2 times daily       acetaminophen (TYLENOL) 325 MG tablet Take 1,000 mg by mouth 3 times daily        polyethylene glycol (MIRALAX/GLYCOLAX) powder Take 17 g by mouth 2 times daily        Dalfampridine (AMPYRA) 10 MG TB12 Take 10 mg by mouth daily        Medications  "reviewed:  Medications reconciled to facility chart and changes were made to reflect current medications as identified as above med list. Below are the changes that were made:   Medications stopped since last EPIC medication reconciliation:   Medications Discontinued During This Encounter   Medication Reason     enoxaparin (LOVENOX) 40 MG/0.4ML injection Medication Reconciliation Clean Up       Medications started since last [facility] medication reconciliation:  No orders of the defined types were placed in this encounter.    REVIEW OF SYSTEMS:  10 point ROS (including Respiratory, CV, GI and ) other than that noted in the HPI,  is negative.     Physical Exam:  /51  Pulse 83  Temp 97.7  F (36.5  C)  Resp 16  Ht 5' 6\" (1.676 m)  Wt 154 lb (69.9 kg)  SpO2 97%  BMI 24.86 kg/m2  GENERAL APPEARANCE:  Alert, in no distress, appears healthy, cooperative  RESP:  respiratory effort and palpation of chest normal, lungs clear to auscultation, no respiratory distress, no stridor  CV:  Palpation and auscultation of heart done, regular rate and rhythm, no murmur, rub, or gallop, no edema, +2 pedal pulses  :    Examination/palpation of bladder unremarkable, Lugo catheter draining clear yellow urine   M/S:   Digits and nails normal, no signs of bleeding or bruising noted.   SKIN:  Inspection of skin and subcutaneous tissue baseline, wounds on BLE covered with Kerlix, bilateral heel pressure ulcers covered. No drainage noted on any of the dressings.  NEURO:   Examination of sensation by touch normal. No movement LE at baseline.   PSYCH:  oriented X 3, normal insight, judgement and memory    Recent Labs:      CBC RESULTS:   Recent Labs   Lab Test 02/14/17 02/06/17   WBC  7.8  10.2   RBC  3.23*  3.06*   HGB  10.3*  9.6*   HCT  33.6*  31.4*   MCV  104*  103*   MCH  32  31   MCHC  31*  31*   RDW  19.8*  20.2*   PLT  354  388       Last Basic Metabolic Panel:  Recent Labs   Lab Test  03/08/17   1231 02/14/17 02/03/17   NA   " --   142  138   POTASSIUM  4.3  3.8  4.0   CHLORIDE   --   103  102   CAITLYN   --   9.2  8.9   CO2   --   31  26   BUN   --   25*  16   CR   --   0.70  0.66   GLC   --   97  121*       Liver Function Studies -   Recent Labs   Lab Test 01/06/17 01/04/17   0714  12/24/16   1115   PROTTOTAL   --   5.0*  7.2   ALBUMIN   --   1.2*  1.5*   BILITOTAL   --   0.2  0.3   ALKPHOS   --   101  158*   AST  14  13  30   ALT   --   <6  34       Lab Results   Component Value Date    A1C 5.0 02/06/2017    A1C 6.3 11/01/2016       Assessment/Plan:    Acute deep vein thrombosis (DVT) of tibial vein of left lower extremity (H)  Long term (current) use of anticoagulants  Encounter for therapeutic drug monitoring  MS (multiple sclerosis) (H)  Chronic, stable. Continue on current meds. INR in desired range - continue same dose, check INR in one week.     Type 2 diabetes mellitus with diabetic neuropathy, without long-term current use of insulin (H)  Neuropathy (H)  Chronic, stable. Continue current meds.     Non-small cell cancer of left lung (H) Chronic.    Has oncology appt on 4/8/17.    Edema, unspecified type  Improving.  Will trial off lasix.    Skin breakdown   Stable, improving.  Continue with wound care team.    Weight loss  Chronic. Will ask nursing to get weight today and continue on weekly weights on Mondays.      Orders:  1. Obtain weight today.  2. D/C Lasix. Update NP in 1 week on edema.  3. INR 2.0. Coumadin 3 mg PO MWF and 2 mg PO other days. Check INR in one week.     Mavis Tipton NP student    This patient was seen along with a nurse practitioner student, Mavis Tipton.  The histories and reviews of systems were obtained by student and confirmed by myself. All objective, assessments and plans were completed by myself.   Jackelyn Dixon    Total time spent with patient visit was 35 min including patient visit, review of past records and discussion of patient status and POC with staff. Greater than 50% of total  time spent with counseling and coordinating care.     Electronically signed by  RE Kaufman CNP

## 2017-03-30 NOTE — MR AVS SNAPSHOT
After Visit Summary   3/30/2017    Adwoa Peralta    MRN: [id number]           Patient Information     Date Of Birth          1940        Visit Information        Provider Department      3/30/2017 12:00 PM Jackelyn Dixon APRN CNP [facility]        Today's Diagnoses     Acute deep vein thrombosis (DVT) of tibial vein of left lower extremity (H)    -  1    Long term (current) use of anticoagulants        Encounter for therapeutic drug monitoring        MS (multiple sclerosis) (H)        Type 2 diabetes mellitus with diabetic neuropathy, without long-term current use of insulin (H)        Non-small cell cancer of left lung (H)        Neuropathy (H)        Edema, unspecified type        Skin breakdown        Weight loss           Follow-ups after your visit        Your next 10 appointments already scheduled     Mar 31, 2017 10:40 AM CDT   CT CHEST W/O CONTRAST with UUCT1   [facility], [place], [place] ([facility], [facility])    [address] [facility] [address] [phone]           Please bring any scans or X-rays taken at other hospitals, if similar tests were done. Also bring a list of your medicines, including vitamins, minerals and over-the-counter drugs. It is safest to leave personal items at home.  Be sure to tell your doctor:   If you have any allergies.   If there s any chance you are pregnant.   If you are breastfeeding.   If you have any special needs.  You do not need to do anything special to prepare.  Please wear loose clothing, such as a sweat suit or jogging clothes. Avoid snaps, zippers and other metal. We may ask you to undress and put on a hospital gown.            Apr 05, 2017 10:00 AM CDT   Return Visit with Iggy Dubose MD   Radiation Oncology Clinic ([facility])    [facility]  1st Floor  [address] [facility] [address] [phone]              Who to  "contact     If you have questions or need follow up information about today's clinic visit or your schedule please contact [facility] directly at [phone].  Normal or non-critical lab and imaging results will be communicated to you by MyChart, letter or phone within 4 business days after the clinic has received the results. If you do not hear from us within 7 days, please contact the clinic through [employer] or phone. If you have a critical or abnormal lab result, we will notify you by phone as soon as possible.  Submit refill requests through [employer] or call your pharmacy and they will forward the refill request to us. Please allow 3 business days for your refill to be completed.          Additional Information About Your Visit        [employer] Information     [employer] lets you send messages to your doctor, view your test results, renew your prescriptions, schedule appointments and more. To sign up, go to [email or web address] . Click on \"Log in\" on the left side of the screen, which will take you to the Welcome page. Then click on \"Sign up Now\" on the right side of the page.     You will be asked to enter the access code listed below, as well as some personal information. Please follow the directions to create your username and password.     Your access code is: [email or web address]  Expires: 4/3/2017  9:42 AM     Your access code will  in 90 days. If you need help or a new code, please call your [place] clinic or [phone].        Care EveryWhere ID     This is your Care EveryWhere ID. This could be used by other organizations to access your [place] medical records  [id number]        Your Vitals Were     Pulse Temperature Respirations Height Pulse Oximetry BMI (Body Mass Index)    83 97.7  F (36.5  C) 16 5' 6\" (1.676 m) 97% 24.86 kg/m2       Blood Pressure from Last 3 Encounters:   17 116/51   17 122/67   17 95/55    Weight from Last 3 Encounters:   17 154 lb (69.9 " kg)   03/20/17 157 lb 3.2 oz (71.3 kg)   03/14/17 157 lb 3.2 oz (71.3 kg)              Today, you had the following     No orders found for display         Today's Medication Changes          These changes are accurate as of: 3/30/17  1:19 PM.  If you have any questions, ask your nurse or doctor.               Stop taking these medicines if you haven't already. Please contact your care team if you have questions.     LASIX PO   Stopped by:  Jackelyn Dixon APRN CNP                    Primary Care Provider Office Phone # Fax #    [facility] [phone]        [facility]IATRIC [facility] [address] [facility] [address]  [facility] [address]        Thank you!     Thank you for choosing [facility]  for your care. Our goal is always to provide you with excellent care. Hearing back from our patients is one way we can continue to improve our services. Please take a few minutes to complete the written survey that you may receive in the mail after your visit with us. Thank you!             Your Updated Medication List - Protect others around you: Learn how to safely use, store and throw away your medicines at www.disposemymeds.org.          This list is accurate as of: 3/30/17  1:19 PM.  Always use your most recent med list.                   Brand Name Dispense Instructions for use    acetaminophen 325 MG tablet    TYLENOL     Take 1,000 mg by mouth 3 times daily       AMPYRA 10 MG Tb12   Generic drug:  Dalfampridine      Take 10 mg by mouth daily       Calcium Carb-Cholecalciferol 500-200 MG-UNIT Tabs      Take 1 tablet by mouth 2 times daily       CITALOPRAM HYDROBROMIDE PO      Take 20 mg by mouth daily       collagenase ointment      Apply topically every evening       COUMADIN PO      2mg by mouth daily       GABAPENTIN PO      Take 600 mg by mouth 2 times daily       ipratropium - albuterol 0.5 mg/2.5 mg/3 mL 0.5-2.5 (3) MG/3ML neb solution    DUONEB     Take 1 vial by nebulization 4 times  daily       IMELDA Pack     60 packet    Take 1 packet by mouth 2 times daily       lidocaine 2 % topical gel    XYLOCAINE     Apply topically every evening apply daily 50/50 with santyl       metoprolol 25 MG tablet    LOPRESSOR    60 tablet    Take 1 tablet (25 mg) by mouth 2 times daily       miconazole 2 % powder    MICATIN; MICRO GUARD     Apply topically every hour as needed for other (topical candidiasis)       MULTIVITAL PO      Take 1 tablet by mouth every morning       oxyCODONE 5 MG IR tablet    ROXICODONE    30 tablet    Take 1 tablet (5 mg) by mouth every 6 hours And every 6 hrs PRN       polyethylene glycol powder    MIRALAX/GLYCOLAX     Take 17 g by mouth 2 times daily

## 2017-04-05 NOTE — PROGRESS NOTES
RADIATION ONCOLOGY FOLLOW-UP VISIT  DATE: Apr 5, 2017    NAME: Adwoa Peralta  MRN: [id number]      DISEASE TREATED: Clinical NSCLC of the LLL, stage [id number]    RADIATION THERAPY DELIVERED: 5000 cGy in 5 twice weekly fractions delivered to the 85% isodose line via SBRT    INTERVAL SINCE COMPLETION OF RT: 3 months since completion on 01/12/2017    SUBJECTIVE:  Ms. Peralta is a 76 year old woman with a history of MS and empyema requiring RLL lobectomy, now with recent clinical diagnosis of NSCLC, stage [id number]. We recommended definitive radiotherapy given her medical comorbidities and prior lobectomy, which was delivered as detailed above. During her treatment course she was admitted for altered mentation, felt likely related to urosepsis. She also had a right lower lobe infiltrate and hypoxemia and was found to have a hematoma of the left vastus lateralis muscle. With antibiotics, her mentation and overall clinical status improved. She otherwise tolerated treatment well without any acute side effects. She now presents today for her first post-treatment followup exam.     Since she completed treatment 3 months ago, she reports that she has been doing reasonably well. She has no pressing issues or complaints today but was eager to find out the results of her recent scans. She denies any changes in regards to her respiratory symptoms since we completed treatment. She denies worsening SOB, cough, or chest pain. Her recent chest CT on 03/31/2017 demonstrated some fibrotic thickening adjacent to the LLL spiculated mass that we treated, and was thought likely due to radiation changes. There was also a new nodular and tree-in-bud opacities predominantly in the RLL suspicious for infection. Additionally, there was a new irregular mass like fibroatelectasis of the RUL, with recommendations for attention on follow-up imaging.     PHYSICAL EXAM:  VITALS: /65  Pulse 95  SpO2 98%  GEN: Appears well, alert, oriented,  and in NAD, lying on transport bed  CV: RRR, no murmurs/rubs/gallops  RESP: Distant breath sounds, but no wheezes/crackles/rhonchi appreciated, breathing comfortably on 6L O2 via NC  SKIN: Normal color and turgor, dry and intact    LABS AND IMAGING: Reviewed. As mentioned above.     IMPRESSION:   Ms. Peralta is a 76 year old female with clinically suspected NSCLC of the LLL, stage cT1b N0 M0. She is status post definitive SBRT, completed 3 months ago. Clinically doing well without any residual side effects of treatment. Recent imaging demonstrates post radiation changes without any evidence of recurrent/progressive disease.     PLAN:  1. RTC in 3 months for routine followup  2. Repeat chest CT prior to visit    Ms. Peralta was seen and discussed with staff, Dr. Dubose.     John Ramey MD  Resident Radiation Oncology  [facility]    I saw the patient with the resident.  I agree with the resident's note and plan of care.      MARLENY Dubose M.D.  Department of Radiation Oncology  [facility]    CC  Patient Care Team:  Diana Licea APRN CNP as PCP - General (Nurse Practitioner - Gerontology)  Sangeeta Courtney, LIBBY as Registered Nurse  April Holloway as Other (see comments)  Natalya Manjarrez MD as MD (Internal Medicine)  Mark Croft MD as MD (Pulmonary)  Leela Calle Amber as Nursing Assistant  PAPITO ZHANG

## 2017-04-05 NOTE — NURSING NOTE
FOLLOW-UP VISIT    Patient Name: Adwoa Peralta      : 1940     Age: 76 year old        ______________________________________________________________________________     Chief Complaint   Patient presents with     Cancer     3 month fup for rad therapy to chest     /65  Pulse 95  SpO2 98%     Date Radiation Completed: 17    Pain  Current history of pain associated with this visit:   Intensity:0/10  Current:   Location:   Treatment:     Labs  Other Labs: No    Imaging  CT: chest  3/31/17          Other Appointments:     MD Name:  Appointment Date:    MD Name: Appointment Date:   MD Name: Appointment Date:   Other Appointment Notes:     Residual Radiation side effect: none     Additional Instructions:

## 2017-04-05 NOTE — MR AVS SNAPSHOT
After Visit Summary   4/5/2017    Adwoa Peralta    MRN: [id number]           Patient Information     Date Of Birth          1940        Visit Information        Provider Department      4/5/2017 10:00 AM Iggy Dubose MD Radiation Oncology Clinic         Follow-ups after your visit        Your next 10 appointments already scheduled     Jan 03, 2017 10:00 AM   ON TREATMENT VISIT with Iggy Dubose MD   Radiation Oncology Clinic ([facility])    [facility]  1st Floor  [address] [facility] [address]   [phone]            Jan 05, 2017  1:30 PM   EXTERNAL RADIATION TREATMENT with [facility] RAD ONC VARIAN   Radiation Oncology Clinic ([facility])    [facility]  1st Floor  [address] [facility] [address]   [phone]            Mar 31, 2017 10:40 AM   CT CHEST W/O CONTRAST with UUCT1   [facility], [place], [place] ([facility])    [address] [facility] [address]   [phone]           Please bring any scans or X-rays taken at other hospitals, if similar tests were done. Also bring a list of your medicines, including vitamins, minerals and over-the-counter drugs. It is safest to leave personal items at home.  Be sure to tell your doctor:   If you have any allergies.   If there s any chance you are pregnant.   If you are breastfeeding.   If you have any special needs.  You do not need to do anything special to prepare.  Please wear loose clothing, such as a sweat suit or jogging clothes. Avoid snaps, zippers and other metal. We may ask you to undress and put on a hospital gown.            Apr 05, 2017 10:00 AM   Return Visit with Iggy Dubose MD   Radiation Oncology Clinic ([facility])    [facility]  1st Floor  [address] [facility] [address] [phone]              Future tests that  were ordered for you today     Open Future Orders        Priority Expected Expires Ordered    CT Chest w/o Contrast Routine 4/3/2017 7/3/2017 1/3/2017            Who to contact     Please call your clinic at [phone] to:    Ask questions about your health    Make or cancel appointments    Discuss your medicines    Learn about your test results    Speak to your doctor   If you have compliments or concerns about an experience at your clinic, or if you wish to file a complaint, please contact [facility] Patient Relations at [phone] or email us at [email or web address]         Additional Information About Your Visit        [employer] Information     1234ENTERt is an electronic gateway that provides easy, online access to your medical records. With [employer], you can request a clinic appointment, read your test results, renew a prescription or communicate with your care team.     To sign up for [employer] visit the website at [email or web address]   You will be asked to enter the access code listed below, as well as some personal information. Please follow the directions to create your username and password.     Your access code is: [email or web address]  Expires: 4/3/2017  8:42 AM     Your access code will  in 90 days. If you need help or a new code, please contact your [facility] Clinic or call [phone] for assistance.         Blood Pressure from Last 3 Encounters:   17 133/48   16 128/76   16 128/76    Weight from Last 3 Encounters:   16 91.037 kg (200 lb 11.2 oz)   16 84.732 kg (186 lb 12.8 oz)   16 85.548 kg (188 lb 9.6 oz)              Today, you had the following     No orders found for display         Today's Medication Changes      Notice     This visit is during an admission. Changes to the med list made in this visit will be reflected in the After Visit Summary of the admission.             Primary Care  Provider Office Phone # Fax #    [facility] [phone]        GERIATRIC SERVICES [address] [address]        Thank you!     Thank you for choosing RADIATION ONCOLOGY CLINIC  for your care. Our goal is always to provide you with excellent care. Hearing back from our patients is one way we can continue to improve our services. Please take a few minutes to complete the written survey that you may receive in the mail after your visit with us. Thank you!             Your Updated Medication List - Protect others around you: Learn how to safely use, store and throw away your medicines at www.disposemymeds.org.      Notice     This visit is during an admission. Changes to the med list made in this visit will be reflected in the After Visit Summary of the admission.

## 2017-04-05 NOTE — LETTER
4/5/2017       RE: Adwoa Peralta  [facility]  [address]  [facility] [address]     Dear Colleague,    Thank you for referring your patient, Adwoa Peralta, to the RADIATION ONCOLOGY CLINIC. Please see a copy of my visit note below.    RADIATION ONCOLOGY FOLLOW-UP VISIT  DATE: Apr 5, 2017    NAME: Adwoa Peralta  MRN: [id number]      DISEASE TREATED: Clinical NSCLC of the LLL, stage [id number]    RADIATION THERAPY DELIVERED: 5000 cGy in 5 twice weekly fractions delivered to the 85% isodose line via SBRT    INTERVAL SINCE COMPLETION OF RT: 3 months since completion on 01/12/2017    SUBJECTIVE:  Ms. Peralta is a 76 year old woman with a history of MS and empyema requiring RLL lobectomy, now with recent clinical diagnosis of NSCLC, stage [id number]. We recommended definitive radiotherapy given her medical comorbidities and prior lobectomy, which was delivered as detailed above. During her treatment course she was admitted for altered mentation, felt likely related to urosepsis. She also had a right lower lobe infiltrate and hypoxemia and was found to have a hematoma of the left vastus lateralis muscle. With antibiotics, her mentation and overall clinical status improved. She otherwise tolerated treatment well without any acute side effects. She now presents today for her first post-treatment followup exam.     Since she completed treatment 3 months ago, she reports that she has been doing reasonably well. She has no pressing issues or complaints today but was eager to find out the results of her recent scans. She denies any changes in regards to her respiratory symptoms since we completed treatment. She denies worsening SOB, cough, or chest pain. Her recent chest CT on 03/31/2017 demonstrated some fibrotic thickening adjacent to the LLL spiculated mass that we treated, and was thought likely due to radiation changes. There was also a new nodular and tree-in-bud opacities predominantly in the RLL  suspicious for infection. Additionally, there was a new irregular mass like fibroatelectasis of the RUL, with recommendations for attention on follow-up imaging.     PHYSICAL EXAM:  VITALS: /65  Pulse 95  SpO2 98%  GEN: Appears well, alert, oriented, and in NAD, lying on transport bed  CV: RRR, no murmurs/rubs/gallops  RESP: Distant breath sounds, but no wheezes/crackles/rhonchi appreciated, breathing comfortably on 6L O2 via NC  SKIN: Normal color and turgor, dry and intact    LABS AND IMAGING: Reviewed. As mentioned above.     IMPRESSION:   Ms. Peralta is a 76 year old female with clinically suspected NSCLC of the LLL, stage cT1b N0 M0. She is status post definitive SBRT, completed 3 months ago. Clinically doing well without any residual side effects of treatment. Recent imaging demonstrates post radiation changes without any evidence of recurrent/progressive disease.     PLAN:  1. RTC in 3 months for routine followup  2. Repeat chest CT prior to visit    Ms. Peralta was seen and discussed with staff, Dr. Dubose.     John Ramey MD  Resident Radiation Oncology  [facility]    I saw the patient with the resident.  I agree with the resident's note and plan of care.      MARLENY Dubose M.D.  Department of Radiation Oncology  [facility]    CC  Patient Care Team:  Diana Licea APRN CNP as PCP - General (Nurse Practitioner - Gerontology)  Sangeeta Courtney RN as Registered Nurse  April Holloway as Other (see comments)  Natalya Manjarrez MD as MD (Internal Medicine)  Mark Croft MD as MD (Pulmonary)  Leela Calle Amber as Nursing Assistant  PAPITO ZHANG

## 2017-04-09 PROBLEM — R65.21 SEPTIC SHOCK (H): Status: ACTIVE | Noted: 2017-01-01

## 2017-04-09 PROBLEM — A41.9 SEPTIC SHOCK (H): Status: ACTIVE | Noted: 2017-01-01

## 2017-04-09 NOTE — ED PROVIDER NOTES
REVISED REPORT      CHIEF COMPLAINT:  Respiratory distress.      HISTORY OF PRESENT ILLNESS:  Adwoa Peralta is a 76-year-old woman with a history of lung cancer and multiple sclerosis who presents to the ED after being found in her nursing home with agonal respirations, low oxygen saturations and altered mental status.  There is no report of recent illness or injury.  Per EMS, her sats were very low in the 50-70% range.  They tried BiPAP on the patient, but saturations did not significantly improve. History is limited because of the patient's critical illness.     REVIEW OF SYSTEMS:  Positive as stated above, otherwise unobtainable secondary to the patient's critical illness.      PAST MEDICAL HISTORY:   1.  Multiple sclerosis.     2.  Type 2 diabetes.   3.  Diabetic neuropathy.   4.  Chronic leg pain.   5.  Dementia.   6.  Right lung empyema, status post lobectomy.     7.  Nonsmall cell cancer of the lung.   8.  Reflux.   9.  Constipation.   10.  Hyperlipidemia.   11.  Hypertension.   12.  DVT.   13.  Bilateral femur fracture.   14.  Generalized muscle weakness.   15.  Septic shock.      MEDICATIONS:     1.  Coumadin.   2.  Citalopram.   3.  Lidocaine gel.   4.  Oxycodone.     5.  DuoNeb.   6.  Metoprolol.   7.  Miconazole.   8.  Gabapentin.   9.  Tylenol.   10.  MiraLax..     11.  Ampyra.      ALLERGIES:  Dilaudid causes hives and Questran causes rash.      SOCIAL HISTORY:  The patient lives in a nursing home.  She presented alone but was later joined by her son.      FAMILY HISTORY:  Unobtainable.      PHYSICAL EXAMINATION:   VITAL SIGNS:  Temperature 100.3, pulse 122, blood pressure 123/32, O2 sat was initially unobtainable agonal respirations.   GENERAL:  This is critically ill-appearing woman.  She is unresponsive to verbal or painful stimuli.  Eyes are closed.  She has agonal respirations.   HEENT:  Atraumatic.  Pupils are 4 mm and nonreactive.  Patient does not fix or focus.  Oropharynx is without erythema  or exudate.  Mucous membranes are dry.   NECK:  Supple, nontender.  There is no lymphadenopathy.   LUNGS:  Have diminished breath sounds throughout with rales and rhonchi scattered.   HEART:  Tachycardic, irregularly irregular rhythm.  S1 and S2 heard.   ABDOMEN:  Soft, nontender, nondistended.  Bowel sounds are not heard.   EXTREMITIES:  Cool and mottled.  There are extension contractures of bilateral lower extremities.  Lower legs are wrapped in gauze dressings.  There is trace edema bilaterally.  Initially radial pulses were faintly palpable DP pulses are absent.  There is no facial asymmetry.  The patient does not have any purposeful movement.  Eyes are closed.   NEUROLOGIC:  She is nonverbal and there is no movement of the extremities.  The patient does not withdraw from pain.      EMERGENCY DEPARTMENT COURSE:  This is a 76-year-old woman who presents to the ED with acute respiratory failure.  She is unresponsive.  It is unclear how long she has had these low oxygen saturations.      CODE STATUS was confusing, as the patient had a paper from [facility] which showed her to be DNR/DNI, but this has not been signed by  the patient or her family member.  The family member requested transport to the hospital when he was informed that she was likely dying.      The patient was bagged and preparation for intubation was made.      PROCEDURE NOTE:  Intubation.  The patient was intubated using a MAC IV with a direct laryngoscopy.  ET tube was a 7.5 and placed 23 cm at the lip.  The cuff was inflated.  There was color change on the CO2 monitor and breath sounds were auscultated in both lungs.  The patient did not require any sedation.      During the intubation attempts at establishing venous access by nursing were unsuccessful.  I placed an IO in her right shin.  Epinephrine and IV fluid bolus were infused along with some bicarbonate.  During the time of intubation the patient was also noted to lose her pulses.  CPR  was begun.  On the monitor she shows sinus tachycardia and would at times look to be rapid atrial fibrillation, after 2 rounds of epinephrine and bicarbonate along with IV fluid boluses patient's pulse returned and compressions were able to be discontinued.  EKG showed atrial fibrillation with rapid ventricular response and some ischemic looking ST and T-wave changes anteriorly and inferiorly.  There is no ST elevation suggesting ST elevation MI.  Additional attempts at IV access and ABG were unsuccessful.  The patient's blood pressures remained low and so preparation was made for central line.      PROCEDURE NOTE:  Consent was implied due to the critical nature of the situation.  The patient's son had previously told me on the phone that he wished for aggressive measures at resuscitation.  Using sterile technique patient's right neck was prepped and a triple lumen catheter was placed in the right IJ using ultrasound guidance.  The attempt was complicated by multiple attempts because of the compressibility the patient's IJ.  Eventually, the vessel was cannulated and post-intubation and post-line x-ray showed the line to be in adequate position and ET tube to be above the yasmeen.  There is also extensive right upper lobe consolidation with infiltrate and also likely left lower lobe infiltrate.  Measurements of the patient's lung tumors seemed to be larger than previous images.      There was delay in getting the patient's lab work and x-ray because of the above-mentioned procedures.  I was concerned for sepsis with likely a pulmonary source or perhaps UTI given the patient's history of past urosepsis.  A dose of Zosyn was ordered, prior to cultures or lab studies.        After completion of a central line placement laboratory testing was sent.  Sodium was elevated at 152.  Potassium is low at 3, chloride elevated at 114, BUN and creatinine are also elevated at 50 and 1.41.  Hepatic panel looks fairly unremarkable.   Lactic acid was high at 10.6.  Glucose is borderline low at 62.  Initial pH was 7.1 with a pCO2 of 77 and bicarbonate normal at 24 with a pO2 of only 40.  White cell count is elevated at 7.2 with a left shift.  Hemoglobin was stable and low at 10.  INR is supratherapeutic at 6.57.  The patient also noted to have an elevated D-dimer.  Urinalysis suggests infection.  Urine culture was sent.  Blood cultures were sent and were also pending.      Patient's rate on the ventilator was increased in response to first gas.  She had a second dose of bicarbonate.  Levophed had been started already to support blood pressures.  Despite the vent adjustments patient continued to have difficulty achieving good saturations.  A DuoNeb was tried and she was given an IV dose of Solu-Medrol.  A second gas showed improvement with a pH of 7.23, pCO2 66 and was still low pO2 at 37.        Initially with the patient's altered mental status and elevated INR there was a plan to send her for CT scan of the brain to evaluate for intracranial hemorrhage or other acute insults which may have been present.  She was never stable enough for this to be done.      She was maxed out on Levophed and blood pressures continued to drift downward.  A push dose of phenylephrine was given with transient improvement in pressures.      At this time, the patient's son arrived.  The patient still had not shown any signs of responsiveness despite having received no sedation throughout these procedures.  Corneal reflex and pupillary reaction was absent.  It is unclear how long the patient may have been hypoxemic and may have also suffered anoxic brain injury.  I did discuss the patient's grave condition with her son and he requested further consult with the critical care to discuss prognosis.  I discussed the case with Dr. Masterson on-call for critical care.  I described the patient's neurologic exam,, vital signs and current testing.  He thought that the chance for  meaningful recovery was small but that this was not completely certain.  I discussed this with the patient's son and after much discussion, he decided not to further escalate care, and in fact to withdraw care.      The patient's son understood that her likelihood of survival at all was very small and the likelihood of meaningful neurologic status was vanishingly small.      With the patient's son present respiratory therapy, nursing and I discontinued the pressors, IV fluids, oxygen and ventilator support.      Prior to discontinuing I repeated neurologic exam and she continued to have no pupillary reflex absent corneal reflexes.  She did still have some irregular breathing over the vent settings.      When support was removed the patient had continued agonal respirations and slowly decreasing blood pressures.  I discussed the case with Dr. Ruby from the hospitalist service and he agreed to accept the patient for admission for comfort cares on the floor.      I discussed with patient's son the timing of her death was uncertain and he plans to stay with her on the floor at this time.      The patient is admitted in critical condition.      IMPRESSION:   1.  Respiratory arrest.   2.  Septic shock.   3.  Pneumonia.   4.  Urinary tract infection.      CRITICAL CARE TIME for this patient exclusive of procedures was 110 minutes.      Addendum:  d&t:  2017, Document #[id number], EM/sp         SAMINA VALERIO MD             D: 2017 07:58   T: 2017 18:14   MT: DANIELE#[id number]      Name:     CORAL COOPER   MRN:      [id number]        Account:      [id number]   :      1940           Visit Date:   2017      Document: [id number]

## 2017-04-09 NOTE — ED NOTES
[facility]  ED Nurse Handoff Report    ED Chief complaint: Respiratory Arrest (agonal breathing, no IV access, on CPAP)      ED Diagnosis:   Final diagnoses:   None       Code Status: To be addressed by admitting MD    Allergies:   Allergies   Allergen Reactions     Dilaudid [Hydromorphone] Hives     Per patient      Questran [Cholestyramine] Rash     Itchy rash       Activity level - Baseline/Home:  Total Care    Activity Level - Current:   Total Care     Needed?: No    Isolation: No  Infection: Not Applicable    Bariatric?: No    Vital Signs:   Vitals:    04/09/17 0315 04/09/17 0320 04/09/17 0325 04/09/17 0330   BP: (!) 65/55 (!) 42/25 (!) 78/32 91/53   Resp: 18 18 18 19   Temp:       TempSrc:       SpO2: 91% 91% 91% 92%   Weight:           Cardiac Rhythm: ,   Cardiac  Cardiac Rhythm: Sinus tachycardia    Pain level:      Is this patient confused?: No neuro response at this time of arrival to ED    Patient Report: Initial Complaint: pt presented in respiratory arrest from nursing home.  Focused Assessment: pt extubated at 0403 and all life sustaining care discontinued at that time  Tests Performed: xray, labs, blood cultures, UA/culture  Abnormal Results: labs, xray  Treatments provided: neb, pressors, fluids, pain management    Family Comments: son is present at bedside. Family conference completed with writer of this note, Dr. Giraldo and son- decision to discontinue life sustaining care made. Pt gtts stopped at that time and pt then extubated.    OBS brochure/video discussed/provided to patient: N/A    ED Medications:   Medications   lactated ringers infusion ( Intravenous New Bag 4/9/17 0216)   norepinephrine (LEVOPHED) 16 mg in D5W 250 mL infusion (0.1 mcg/kg/min × 69.9 kg Intravenous Rate/Dose Change 4/9/17 0307)   lactated ringers BOLUS 2,097 mL (0 mLs Intravenous Stopped 4/9/17 0216)   0.9% sodium chloride BOLUS (0 mLs Intravenous Stopped 4/9/17 0204)   piperacillin-tazobactam  (ZOSYN) 4.5 g vial to attach to  mL bag (0 g Intravenous Stopped 4/9/17 0325)   ipratropium - albuterol 0.5 mg/2.5 mg/3 mL (DUONEB) 0.5-2.5 (3) MG/3ML neb solution (3 mLs  Given 4/9/17 0252)   methylPREDNISolone sodium succinate (solu-MEDROL) injection 125 mg (125 mg Intravenous Given 4/9/17 0248)   sodium bicarbonate 8.4 % injection 50 mEq (50 mEq Intravenous Given 4/9/17 0248)   0.9% sodium chloride BOLUS (0 mLs Intravenous Stopped 4/9/17 0335)   phenylephrine (BOB-SYNEPHRINE) injection 40 mcg (40 mcg Intravenous Given 4/9/17 0328)       Drips infusing?: NO      ED NURSE PHONE NUMBER: [phone]

## 2017-04-09 NOTE — PROGRESS NOTES
Death Note    Called because the pt had . I came to the room and examined her. She had no respirations, no heartbeat, no response to sternal rub. Pupils fixed and dilated. Time of death 0550. Nursing notifying family.       Anabelle Villalta MD

## 2017-04-09 NOTE — ED NOTES
Family conference had with Dr. Giraldo and son along with the writer of this note present- plan to extubate pt and stop all life sustaining care at this time made

## 2017-04-09 NOTE — DISCHARGE SUMMARY
[facility]    Death Summary  Hospitalist    Date of Admission:  2017  Date of Death:         2017  Provider Completing Death Summary: Ace Ruby MD  Date of Service (when I saw the patient): 17    Discharge Diagnoses   Septic shock due to pneumonia    History of Present Illness   Adwoa Peralta is a 76 year old woman with non-squamous cell lung cancer on XRT, MS, recent empyema status post right lower lobectomy, DVT on Warfarin, and recent UTI and kidney stone removal who presents with acute respiratory failure and arrest to the ED this morning. She was initially intubated but her course did not improve and she was compassionately extubated in the ED and her care transitioned to Comfort Measures Only.     Hospital Course   Adwoa Peralta was admitted on 2017.  The following problems were addressed during her hospitalization:    1) Respiratory arrest: Likely due to septic shock due to pneumonia. She presented from her nursing home with respiratory arrest. She had a fever, hypotension and markedly elevated lactate and CXR shows multiple bilateral infiltrates; she was initially intubated and started on Levophed and antibiotics. Her course did not improve. Her son is at the bedside and he agrees that she would want to be kept comfortable as she actively dies. She was extubated compassionately at 4 AM, transitioned briefly to the medical floor, and  peacefully at 5:50 AM.     Active Problems:    Septic shock (H)      Cause of death: Septic shock due to pneumonia     Ace Ruby MD       Pending Results   Unresulted Labs Ordered in the Past 30 Days of this Admission     Date and Time Order Name Status Description    2017 0202 Blood culture In process     2017 0202 Blood culture In process     2017 0202 Urine Culture Aerobic Bacterial In process           Primary Care Physician   Diana Licea    Consultations This Hospital Stay   SPIRITUAL  HEALTH SERVICES IP CONSULT    Time Spent on this Encounter   I, Ace Ruby, personally saw the patient today and spent less than or equal to 30 minutes discharging this patient.    Data   Most Recent 3 CBC's:  Recent Labs   Lab Test  04/09/17   0240 02/14/17 02/06/17   WBC  17.2*  7.8  10.2   HGB  10.0*  10.3*  9.6*   MCV  103*  104*  103*   PLT  346  354  388      Most Recent 3 BMP's:  Recent Labs   Lab Test  04/09/17   0240  03/08/17   1231 02/14/17 02/03/17   NA  152*   --   142  138   POTASSIUM  3.0*  4.3  3.8  4.0   CHLORIDE  114*   --   103  102   CO2  25   --   31  26   BUN  50*   --   25*  16   CR  1.41*   --   0.70  0.66   ANIONGAP  13   --   8.0  10.0   CAITLYN  11.8*   --   9.2  8.9   GLC  62*   --   97  121*     Most Recent 2 LFT's:  Recent Labs   Lab Test  04/09/17   0240 01/06/17 01/04/17   0714   AST  41  14  13   ALT  21   --   <6   ALKPHOS  123   --   101   BILITOTAL  0.2   --   0.2     Most Recent INR's and Anticoagulation Dosing History:  Anticoagulation Dose History     Recent Dosing and Labs Latest Ref Rng & Units 3/8/2017 3/16/2017 3/20/2017 3/23/2017 3/30/2017 3/30/2017 4/9/2017    INR 0.86 - 1.14 1.04 1.4(A) 2.5(A) 2.2(A) 2.0(A) 2.0(A) 6.57(HH)        Most Recent 3 Troponin's:No lab results found.  Most Recent Cholesterol Panel:  Recent Labs   Lab Test 12/05/16   CHOL  143   LDL  47   HDL  73   TRIG  116     Most Recent 6 Bacteria Isolates From Any Culture (See EPIC Reports for Culture Details):  Recent Labs   Lab Test  01/05/17   0750  01/05/17   0737  01/04/17   0820  01/04/17   0723  01/04/17   0714  01/03/17   0755   CULT  No growth  No growth  10,000 to 50,000 colonies/mL Candida albicans / dubliniensis Candida albicans and   Candida dubliniensis are not routinely speciated  Susceptibility testing not routinely done  *  No growth  No growth  No growth     Most Recent TSH, T4 and A1c Labs:  Recent Labs   Lab Test 02/06/17 05/04/16   TSH   --    --   1.67   A1C  5.0   < >   --      < > = values in this interval not displayed.     Results for orders placed or performed during the hospital encounter of 04/09/17   XR Chest Port 1 View    Narrative    XR CHEST PORT 1 VW   4/9/2017 2:51 AM     INDICATION: Post intubation, respiratory failure.    COMPARISON: CT 3/31/2017. Chest x-ray 12/26/2016.      Impression    IMPRESSION: Dense infiltrate with consolidation in the right upper  lung. Approximately 3.8 cm nodular opacity in the left lung at the  level of the hilum corresponding to the nodular density seen on recent  CT. Mild patchy infiltrate throughout the left mid and lower lung and  mild patchy density at the right base. Heart size within normal  limits. ETT tip in the mid trachea at the level of the aortic arch.  Right IJ line tip in SVC.    ALEC DESAI MD

## 2017-04-09 NOTE — H&P
[facility]    History and Physical  Hospitalist       Date of Admission:  4/9/2017  Date of Service (when I saw the patient): 04/09/17    Assessment & Plan   Adwoa Peralta is a 76 year old woman with non-squamous cell lung cancer on XRT, MS, recent empyema status post right lower lobectomy, DVT on Warfarin, and recent UTI and kidney stone removal who presents with acute respiratory failure and arrest to the ED this morning. She was initially intubated but her course did not improve and she was compassionately extubated in the ED and her care transitioned to Comfort Measures Only.    1) Respiratory arrest: Likely due to septic shock due to pneumonia. She presented from her nursing home with respiratory arrest. She had a fever, hypotension and markedly elevated lactate and CXR shows multiple bilateral infiltrates; she was initially intubated and started on Levophed and antibiotics. Her course did not improve. Her son is at the bedside and he agrees that she would want to be kept comfortable as she actively dies. She was extubated and now has agonal breathing.    -- Comfort Care order set placed    -- In the unlikely event she is still alive by Monday a hospice consult could be placed    Her chronic medical conditions will not be treated today.    DVT Prophylaxis: None  Code Status: DNR / DNI    Disposition: As above    Ace Ruby MD    Primary Care Physician   *Diana Licea    Chief Complaint   Dyspnea    History is obtained from the patient; history obtained from her son at the bedside and the ED physician whom I have spoken with    History of Present Illness   Adwoa Peralta is a 76 year old woman who presents with acute onset dyspnea and low blood pressure that reportedly started today at her nursing home. At the time of her arrival to the ED scout reportedly had no level of alertness. Now she has agonal breathing. Her son says she would want to be kept comfortable as she passes  away.     Past Medical History    I have reviewed this patient's medical history and updated it with pertinent information if needed.   Past Medical History:   Diagnosis Date     Cancer (H)      Diabetes (H)     new dx 8/2016     Gastroesophageal reflux disease      History of blood transfusion      Hyperlipidemia      Hypertension      Lung cancer (H)      Lung nodule      Multiple sclerosis (H)      Neuromuscular disorder (H)      Oxygen dependent      Vitamin D deficiency        Past Surgical History   I have reviewed this patient's surgical history and updated it with pertinent information if needed.  Past Surgical History:   Procedure Laterality Date     COMBINED CYSTOSCOPY, INSERT STENT URETER(S) Right 12/27/2016    Procedure: COMBINED CYSTOSCOPY, INSERT STENT URETER(S);  Surgeon: Quinn Hawthorne MD;  Location:  OR     CYSTOSCOPY, REMOVE STENT(S), COMBINED Right 3/8/2017    Procedure: COMBINED CYSTOSCOPY, REMOVE STENT(S);  Surgeon: Quinn Hawthorne MD;  Location:  OR     CYSTOSCOPY, RETROGRADES, INSERT STENT URETER(S), COMBINED Right 12/27/2016    Procedure: COMBINED CYSTOSCOPY, RETROGRADES, INSERT STENT URETER(S);  Surgeon: Quinn Hawthorne MD;  Location:  OR     CYSTOSCOPY, URETEROSCOPY, COMBINED  3/8/2017    Procedure: COMBINED CYSTOSCOPY, URETEROSCOPY;  Surgeon: Quinn Hawthorne MD;  Location:  OR     DECORTICATE LUNG  7/1/2014    Procedure: DECORTICATE LUNG;  Surgeon: Eyal Echols MD;  Location:  OR     GYN SURGERY       LOBECTOMY LUNG  7/1/2014    Procedure: LOBECTOMY LUNG;  Surgeon: Eyal Echols MD;  Location:  OR     THORACOTOMY  7/1/2014    Procedure: THORACOTOMY;  Surgeon: Eyal Echols MD;  Location:  OR       Prior to Admission Medications   Prior to Admission Medications   Prescriptions Last Dose Informant Patient Reported? Taking?   CITALOPRAM HYDROBROMIDE PO   Yes No   Sig: Take 20 mg by mouth daily   Calcium  Carb-Cholecalciferol 500-200 MG-UNIT TABS   Yes No   Sig: Take 1 tablet by mouth 2 times daily   Dalfampridine (AMPYRA) 10 MG TB12  Self Yes No   Sig: Take 10 mg by mouth daily    GABAPENTIN PO   Yes No   Sig: Take 600 mg by mouth 2 times daily   Multiple Vitamins-Minerals (MULTIVITAL PO)   Yes No   Sig: Take 1 tablet by mouth every morning   Nutritional Supplements (IMELDA) PACK   Yes No   Sig: Take 1 packet by mouth 2 times daily   Warfarin Sodium (COUMADIN PO)   Yes No   Simg by mouth daily   acetaminophen (TYLENOL) 325 MG tablet  Self Yes No   Sig: Take 1,000 mg by mouth 3 times daily    collagenase ointment   Yes No   Sig: Apply topically every evening   ipratropium - albuterol 0.5 mg/2.5 mg/3 mL (DUONEB) 0.5-2.5 (3) MG/3ML neb solution   Yes No   Sig: Take 1 vial by nebulization 4 times daily   lidocaine (XYLOCAINE) 2 % topical gel   Yes No   Sig: Apply topically every evening apply daily 50/50 with santyl   metoprolol (LOPRESSOR) 25 MG tablet   No No   Sig: Take 1 tablet (25 mg) by mouth 2 times daily   miconazole (MICATIN; MICRO GUARD) 2 % powder   No No   Sig: Apply topically every hour as needed for other (topical candidiasis)   oxyCODONE (ROXICODONE) 5 MG IR tablet   No No   Sig: Take 1 tablet (5 mg) by mouth every 6 hours And every 6 hrs PRN   polyethylene glycol (MIRALAX/GLYCOLAX) powder  Self Yes No   Sig: Take 17 g by mouth 2 times daily       Facility-Administered Medications: None     Allergies   Allergies   Allergen Reactions     Dilaudid [Hydromorphone] Hives     Per patient      Questran [Cholestyramine] Rash     Itchy rash       Social History   I have reviewed this patient's social history and updated it with pertinent information if needed. Adwoa Peralta  reports that she has been smoking Cigarettes.  She has smoked for the past 60.00 years. She has never used smokeless tobacco. She reports that she does not drink alcohol or use illicit drugs.    Family History   Family history assessed  and, except as above, is non-contributory.    Review of Systems   The 10 point Review of Systems is negative other than noted in the HPI or here.     Physical Exam   Temp: 100.3  F (37.9  C) Temp src: Rectal BP: (!) 94/25   Heart Rate: 111 Resp: 17 SpO2: 91 % O2 Device: Mechanical Ventilator    Vital Signs with Ranges  Temp:  [100.3  F (37.9  C)] 100.3  F (37.9  C)  Heart Rate:  [108-168] 111  Resp:  [] 17  BP: ()/(21-81) 94/25  FiO2 (%):  [100 %] 100 %  SpO2:  [72 %-94 %] 91 %  157 lbs 0 oz    Constitutional: Agonal breathing  HEENT: normocephalic moist mucus membranes  Respiratory: lungs with diffuse rales to auscultation bilaterally  Cardiovascular: regular S1 S2 but very faint  GI: abdomen soft non tender non distended bowel sounds positive  Lymph/Hematologic: pale, no cervical lymphadenopathy  Skin: no rash, good turgor  Musculoskeletal: mild bilateral edema  Neurologic:GCS 4, pupils fixed    Data   Data reviewed today:  I personally reviewed the chest x-ray image(s) showing bilateral infiltrates.    Recent Labs  Lab 04/09/17  0240   WBC 17.2*   HGB 10.0*   *      INR 6.57*   *   POTASSIUM 3.0*   CHLORIDE 114*   CO2 25   BUN 50*   CR 1.41*   ANIONGAP 13   CAITLYN 11.8*   GLC 62*   ALBUMIN 0.9*   PROTTOTAL 5.6*   BILITOTAL 0.2   ALKPHOS 123   ALT 21   AST 41       Recent Results (from the past 24 hour(s))   XR Chest Port 1 View    Narrative    XR CHEST PORT 1 VW   4/9/2017 2:51 AM     INDICATION: Post intubation, respiratory failure.    COMPARISON: CT 3/31/2017. Chest x-ray 12/26/2016.      Impression    IMPRESSION: Dense infiltrate with consolidation in the right upper  lung. Approximately 3.8 cm nodular opacity in the left lung at the  level of the hilum corresponding to the nodular density seen on recent  CT. Mild patchy infiltrate throughout the left mid and lower lung and  mild patchy density at the right base. Heart size within normal  limits. ETT tip in the mid trachea at the  level of the aortic arch.  Right IJ line tip in SVC.    ALEC DESAI MD

## 2017-04-10 VITALS
SYSTOLIC BLOOD PRESSURE: 145 MMHG | HEART RATE: 92 BPM | TEMPERATURE: 96.8 F | WEIGHT: 151.1 LBS | HEIGHT: 66 IN | RESPIRATION RATE: 18 BRPM | OXYGEN SATURATION: 94 % | BODY MASS INDEX: 24.28 KG/M2 | DIASTOLIC BLOOD PRESSURE: 78 MMHG

## 2017-04-10 NOTE — PROGRESS NOTES
[facility]    Chief Complaint   Patient presents with     Nursing Home Acute       HPI:    Adwoa Peralta is a 76 year old  (1940), who is being seen today for an episodic care visit at [facility] . Today's concern is:  Lethargy  Non-small cell cancer of left lung (H)  Notified by nursing that temp was 95.9 axillary, pt felt clammy and more lethargic than usual. Urinating and having bowel movements, eating less than usual. Pain medications held due to status. Rectal temp 98.5.  Grimacing with turning, minimal talking but following commands.    Recent repeat CT scan resulted as:  1. Spiculated mass in the left lower lobe has enlarged compared to  prior comparison 10/21/2016. There is new adjacent fibrotic  thickening, which may be radiation related. Difficult to completely  differentiate post radiation changes from increasing size of the mass.  Attention on follow-up.  2. New nodular and tree-in-bud opacities predominantly in the right  lower lobe suspicious for infection.  3. New irregular mass like fibroatelectasis of the right upper lobe.   Clinical correlation with radiation history if this may be related to  radiation therapy. Otherwise, finding raises concern for infection or malignancy. Attention on follow-up recommended.    Patient followed up with oncologist after the scan. No additional orders, instructed to follow up in 3 months.       ALLERGIES: Dilaudid [hydromorphone] and Questran [cholestyramine]  Past Medical, Surgical, Family and Social History reviewed and updated in [facility].    Current Outpatient Prescriptions   Medication Sig Dispense Refill     Warfarin Sodium (COUMADIN PO) 2mg by mouth daily       CITALOPRAM HYDROBROMIDE PO Take 20 mg by mouth daily       collagenase ointment Apply topically every evening       lidocaine (XYLOCAINE) 2 % topical gel Apply topically every evening apply daily 50/50 with santyl       oxyCODONE (ROXICODONE) 5 MG IR tablet Take 1 tablet (5  "mg) by mouth every 6 hours And every 6 hrs PRN 30 tablet 0     Nutritional Supplements (IMELDA) PACK Take 1 packet by mouth 2 times daily 60 packet 1     ipratropium - albuterol 0.5 mg/2.5 mg/3 mL (DUONEB) 0.5-2.5 (3) MG/3ML neb solution Take 1 vial by nebulization 4 times daily       metoprolol (LOPRESSOR) 25 MG tablet Take 1 tablet (25 mg) by mouth 2 times daily 60 tablet      miconazole (MICATIN; MICRO GUARD) 2 % powder Apply topically every hour as needed for other (topical candidiasis)       GABAPENTIN PO Take 600 mg by mouth 2 times daily       Multiple Vitamins-Minerals (MULTIVITAL PO) Take 1 tablet by mouth every morning       Calcium Carb-Cholecalciferol 500-200 MG-UNIT TABS Take 1 tablet by mouth 2 times daily       acetaminophen (TYLENOL) 325 MG tablet Take 1,000 mg by mouth 3 times daily        polyethylene glycol (MIRALAX/GLYCOLAX) powder Take 17 g by mouth 2 times daily        Medications reviewed:  Medications reconciled to facility chart and changes were made to reflect current medications as identified as above med list. Below are the changes that were made:   Medications stopped since last EPIC medication reconciliation:   Medications Discontinued During This Encounter   Medication Reason     Dalfampridine (AMPYRA) 10 MG TB12 Medication Reconciliation Clean Up       Medications started since last [facility] medication reconciliation:  No orders of the defined types were placed in this encounter.    REVIEW OF SYSTEMS:  Unobtainable secondary to cognitive impairment or aphasia.    Physical Exam:  /78  Pulse 92  Temp 96.8  F (36  C)  Resp 18  Ht 5' 6\" (1.676 m)  Wt 151 lb 1.6 oz (68.5 kg)  SpO2 94%  BMI 24.39 kg/m2  GENERAL APPEARANCE: lethargic. Following commands.  RESP:  respiratory effort and palpation of chest normal, lungs clear to auscultation, no respiratory distress, no stridor  CV:  Palpation and auscultation of heart done, regular rate and rhythm, no murmur, rub, or gallop, no edema, " +2 pedal pulses  :  Lugo catheter draining clear yellow urine   M/S:   Digits and nails normal, no signs of bleeding or bruising noted.   SKIN:  Warm, clammy.  Inspection of skin and subcutaneous tissue baseline, wounds on BLE covered with Kerlix, bilateral heel pressure ulcers covered.   NEURO:   Examination of sensation by touch normal. No movement LE at baseline.   PSYCH: cognition intact. Following commands.      Recent Labs:      CBC RESULTS:   Recent Labs   Lab Test  04/09/17   0240 02/14/17   WBC  17.2*  7.8   RBC  3.15*  3.23*   HGB  10.0*  10.3*   HCT  32.4*  33.6*   MCV  103*  104*   MCH  31.7  32   MCHC  30.9*  31*   RDW  17.8*  19.8*   PLT  346  354       Last Basic Metabolic Panel:  Recent Labs   Lab Test  04/09/17   0240  03/08/17   1231 02/14/17   NA  152*   --   142   POTASSIUM  3.0*  4.3  3.8   CHLORIDE  114*   --   103   CAITLYN  11.8*   --   9.2   CO2  25   --   31   BUN  50*   --   25*   CR  1.41*   --   0.70   GLC  62*   --   97       Liver Function Studies -   Recent Labs   Lab Test  04/09/17   0240 01/06/17 01/04/17   0714   PROTTOTAL  5.6*   --   5.0*   ALBUMIN  0.9*   --   1.2*   BILITOTAL  0.2   --   0.2   ALKPHOS  123   --   101   AST  41  14  13   ALT  21   --   <6       Lab Results   Component Value Date    A1C 5.0 02/06/2017    A1C 6.3 11/01/2016       Assessment/Plan:  (R53.83) Lethargy  (primary encounter diagnosis)  (C34.92) Non-small cell cancer of left lung (H)  Comment: acute, stable. VS stable. Lethargic, oriented and more alert by the end of the day. Lungs and urine clear making less likely infection.   Plan: hold narcotics for now. continue to monitor closely. Pt DNR/DNI.      Electronically signed by  RE Sotelo CNP

## 2017-04-12 LAB
BACTERIA SPEC CULT: ABNORMAL
Lab: ABNORMAL
MICRO REPORT STATUS: ABNORMAL
MICROORGANISM SPEC CULT: ABNORMAL
SPECIMEN SOURCE: ABNORMAL

## 2017-05-08 NOTE — OR NURSING
Call placed to [facility] re sensorium, meds, consent(pt confused on admit unaware of procedure to be done, unable to sign consent) Care facility states pt signs own consent.  Call placed to pts son  Re procedure  Call placed to anesthesia re congested cough , not having meds this AM   - - -
